# Patient Record
Sex: FEMALE | Race: BLACK OR AFRICAN AMERICAN | NOT HISPANIC OR LATINO | Employment: OTHER | URBAN - METROPOLITAN AREA
[De-identification: names, ages, dates, MRNs, and addresses within clinical notes are randomized per-mention and may not be internally consistent; named-entity substitution may affect disease eponyms.]

---

## 2017-11-27 ENCOUNTER — TELEPHONE (OUTPATIENT)
Dept: SURGERY | Facility: CLINIC | Age: 77
End: 2017-11-27

## 2017-11-27 ENCOUNTER — OFFICE VISIT (OUTPATIENT)
Dept: FAMILY MEDICINE | Facility: CLINIC | Age: 77
End: 2017-11-27
Payer: MEDICARE

## 2017-11-27 VITALS — HEIGHT: 66 IN | BODY MASS INDEX: 43.71 KG/M2 | HEART RATE: 60 BPM | WEIGHT: 272 LBS | TEMPERATURE: 98 F

## 2017-11-27 DIAGNOSIS — Z86.59 HISTORY OF BEHAVIORAL AND MENTAL HEALTH PROBLEMS: ICD-10-CM

## 2017-11-27 DIAGNOSIS — S21.002A BREAST WOUND, LEFT, INITIAL ENCOUNTER: ICD-10-CM

## 2017-11-27 DIAGNOSIS — E11.22 TYPE 2 DIABETES MELLITUS WITH CHRONIC KIDNEY DISEASE, WITH LONG-TERM CURRENT USE OF INSULIN, UNSPECIFIED CKD STAGE: ICD-10-CM

## 2017-11-27 DIAGNOSIS — I10 ESSENTIAL HYPERTENSION: Primary | ICD-10-CM

## 2017-11-27 DIAGNOSIS — R53.81 DEBILITY: ICD-10-CM

## 2017-11-27 DIAGNOSIS — Z79.4 TYPE 2 DIABETES MELLITUS WITH CHRONIC KIDNEY DISEASE, WITH LONG-TERM CURRENT USE OF INSULIN, UNSPECIFIED CKD STAGE: ICD-10-CM

## 2017-11-27 DIAGNOSIS — R41.3 MEMORY LOSS: ICD-10-CM

## 2017-11-27 DIAGNOSIS — R26.2 INABILITY TO WALK: ICD-10-CM

## 2017-11-27 DIAGNOSIS — G20.A1 PARKINSON DISEASE: ICD-10-CM

## 2017-11-27 DIAGNOSIS — E03.9 HYPOTHYROIDISM, UNSPECIFIED TYPE: ICD-10-CM

## 2017-11-27 DIAGNOSIS — C50.919 MALIGNANT NEOPLASM OF FEMALE BREAST, UNSPECIFIED ESTROGEN RECEPTOR STATUS, UNSPECIFIED LATERALITY, UNSPECIFIED SITE OF BREAST: ICD-10-CM

## 2017-11-27 DIAGNOSIS — N18.30 CKD (CHRONIC KIDNEY DISEASE) STAGE 3, GFR 30-59 ML/MIN: ICD-10-CM

## 2017-11-27 DIAGNOSIS — E78.5 HYPERLIPIDEMIA, UNSPECIFIED HYPERLIPIDEMIA TYPE: ICD-10-CM

## 2017-11-27 DIAGNOSIS — D63.8 ANEMIA OF CHRONIC DISEASE: ICD-10-CM

## 2017-11-27 PROCEDURE — 99215 OFFICE O/P EST HI 40 MIN: CPT | Mod: PBBFAC,PO | Performed by: FAMILY MEDICINE

## 2017-11-27 PROCEDURE — 99215 OFFICE O/P EST HI 40 MIN: CPT | Mod: S$PBB,,, | Performed by: FAMILY MEDICINE

## 2017-11-27 PROCEDURE — 99999 PR PBB SHADOW E&M-EST. PATIENT-LVL V: CPT | Mod: PBBFAC,,, | Performed by: FAMILY MEDICINE

## 2017-11-27 RX ORDER — HALOPERIDOL 2 MG/1
2 TABLET ORAL DAILY
COMMUNITY
Start: 2017-10-31 | End: 2017-12-04 | Stop reason: SDUPTHER

## 2017-11-27 RX ORDER — AMLODIPINE BESYLATE 5 MG/1
TABLET ORAL
COMMUNITY
Start: 2017-11-04 | End: 2017-12-04 | Stop reason: SDUPTHER

## 2017-11-27 RX ORDER — INSULIN LISPRO 100 [IU]/ML
INJECTION, SOLUTION INTRAVENOUS; SUBCUTANEOUS
COMMUNITY
Start: 2017-11-12 | End: 2017-12-04

## 2017-11-27 RX ORDER — ATORVASTATIN CALCIUM 20 MG/1
TABLET, FILM COATED ORAL
COMMUNITY
Start: 2017-09-25 | End: 2017-12-04

## 2017-11-27 RX ORDER — INSULIN GLARGINE 100 [IU]/ML
INJECTION, SOLUTION SUBCUTANEOUS
COMMUNITY
Start: 2017-09-20 | End: 2017-12-04

## 2017-11-27 RX ORDER — LEVOTHYROXINE SODIUM 50 UG/1
50 TABLET ORAL DAILY
COMMUNITY
Start: 2017-10-03 | End: 2017-12-04 | Stop reason: SDUPTHER

## 2017-11-27 RX ORDER — LISINOPRIL 20 MG/1
TABLET ORAL
COMMUNITY
Start: 2017-10-11 | End: 2017-12-04 | Stop reason: SDUPTHER

## 2017-11-27 RX ORDER — PANTOPRAZOLE SODIUM 40 MG/1
TABLET, DELAYED RELEASE ORAL
COMMUNITY
Start: 2017-09-26 | End: 2017-12-04 | Stop reason: SDUPTHER

## 2017-11-27 RX ORDER — MEMANTINE HYDROCHLORIDE 5 MG/1
5 TABLET ORAL 2 TIMES DAILY
COMMUNITY
Start: 2017-10-19 | End: 2017-12-04 | Stop reason: SDUPTHER

## 2017-11-27 RX ORDER — CHOLECALCIFEROL (VITAMIN D3) 125 MCG
CAPSULE ORAL 2 TIMES DAILY
COMMUNITY
End: 2017-12-04

## 2017-11-27 RX ORDER — ESCITALOPRAM OXALATE 10 MG/1
10 TABLET ORAL DAILY
COMMUNITY
Start: 2017-08-31 | End: 2017-12-04 | Stop reason: SDUPTHER

## 2017-11-27 NOTE — PROGRESS NOTES
"Subjective:       Patient ID: Cece Buchanan is a 77 y.o. female.    Chief Complaint: [facility]    Patient is a 77 year old female who presents today to [facility].     She reports that she has a history of breast cancer and has declined mastectomy. She has a breast wound on her left breast that has been going on for about 1 year. She had a  nurse helping with his along with family. She has never seen a wound care doctor as far as she knows. She was last being seen and treated in [place]. She was diagnosed in 2/2017    Patient reports a history of resting tremor, but is not taking anything for this. She is a poor historian and has not brought any records from [place] currently.     HTN has been present for years.     She has had hypothyroidism for "a few months."    Very unclear history from patient and from daughter who is in the room. Will request records.       Review of Systems   Constitutional: Negative for chills and fever.   Musculoskeletal: Positive for gait problem.   Skin: Positive for wound.   Psychiatric/Behavioral: Positive for confusion.         Health Maintenance Due   Topic Date Due    Foot Exam  02/15/1950    Eye Exam  02/15/1950    TETANUS VACCINE  02/15/1959    DEXA SCAN  02/15/1981    Zoster Vaccine  02/15/2001    Pneumococcal (65+) (1 of 2 - PCV13) 02/15/2006    Influenza Vaccine  08/01/2017    Hemoglobin A1c  11/18/2017     Immunization History   Administered Date(s) Administered    PPD Test 10/28/2015       Lab Review   not applicable     Objective:     Vitals:    11/27/17 0939   Pulse: 60   Temp: 97.6 °F (36.4 °C)        Physical Exam   Constitutional:   Frail appearing, in a wheelchair  Poor historian   Pulmonary/Chest:   Fungating appearing, excoriated, bleeding wound noted on left breast   Neurological:   Resting tremor noted BL       Assessment:       1. Essential hypertension    2. History of behavioral and mental health problems    3. Memory loss    4. Breast wound, left, " "initial encounter    5. Type 2 diabetes mellitus with chronic kidney disease, with long-term current use of insulin, unspecified CKD stage    6. Malignant neoplasm of female breast, unspecified estrogen receptor status, unspecified laterality, unspecified site of breast    7. Hypothyroidism, unspecified type    8. Hyperlipidemia, unspecified hyperlipidemia type    9. Debility    10. Inability to walk    11. CKD (chronic kidney disease) stage 3, GFR 30-59 ml/min    12. Anemia of chronic disease    13. Parkinson disease        Plan:       77 year old female, poor historian, complex medical history, and with no records from the past two years presents today with daughter who is POA but apparently not primary caregiver while patient was living in [place]. Patient was diagnosed with breast cancer and refused surgery while in [place]. Patient reports needing Home health today for "a breast wound." Upon examination patient was noted to have a fungating, bleeding, visible left breast tumor with open wound. Daughter in the room reports that she had never seen this before. Patient is apparently agreeable to having surgery now. Spoke to Dr. Solares's office, [facility], appointment scheduled for 9 AM tomorrow.     Patient has multiple other medical issues including diabetes, HTN, hypothyroidism, resting tremor, apparent history of dementia. Referral given to neurology HH, and social work along with breast surgery.     Briefly discussed goals of care, patient states that she wants to "live." Will follow up discussion and review medical records once they are received.     Labs to be drawn tomorrow after breast surgeon appointment.       Essential hypertension  -     Ambulatory referral to Home Health  -     Comprehensive metabolic panel; Future; Expected date: 11/27/2017    History of behavioral and mental health problems  -     Ambulatory referral to Social Work    Memory loss  -     Ambulatory referral to Social Work  -     " Ambulatory referral to Neurology    Breast wound, left, initial encounter  -     Cancel: Ambulatory referral to Wound Clinic    Type 2 diabetes mellitus with chronic kidney disease, with long-term current use of insulin, unspecified CKD stage  -     Hemoglobin A1c; Future; Expected date: 11/27/2017    Malignant neoplasm of female breast, unspecified estrogen receptor status, unspecified laterality, unspecified site of breast  -     Ambulatory consult to General Surgery    Hypothyroidism, unspecified type  -     TSH; Future; Expected date: 11/27/2017  -     T4, free; Future; Expected date: 11/27/2017    Hyperlipidemia, unspecified hyperlipidemia type  -     Lipid panel; Future; Expected date: 11/27/2017  -     Vitamin D; Future; Expected date: 11/27/2017    Debility  -     Ambulatory referral to Home Health  -     Ambulatory referral to Social Work    Inability to walk  -     Ambulatory referral to Home Health  -     Ambulatory referral to Social Work    CKD (chronic kidney disease) stage 3, GFR 30-59 ml/min  -     Vitamin D; Future; Expected date: 11/27/2017    Anemia of chronic disease    Parkinson disease  -     Ambulatory referral to Neurology      70 minutes spent with patient, of which >50% was spent on counseling and coordination of care.     Warning signs discussed, patient to call with any further issues or worsening of symptoms.

## 2017-11-27 NOTE — TELEPHONE ENCOUNTER
11/27/17               1036  Dr. Garcia called regarding patient needing to see Dr. Solares due to prior abnormal mammogram in 2015, refused surgical intervention in the past, but is now requesting surgery with urging of daughter. Patient has an existing wound on breast that is draining. Dr. Garcia states patient has been living in [place] and has had imaging done in [place]. Records being requested by his office. Appointment made for 11/28/17 at 9am. Date and time confirmed. Cell number provided in the event Dr. Solares has any questions.

## 2017-11-28 ENCOUNTER — OFFICE VISIT (OUTPATIENT)
Dept: SURGERY | Facility: CLINIC | Age: 77
End: 2017-11-28
Payer: MEDICARE

## 2017-11-28 ENCOUNTER — TELEPHONE (OUTPATIENT)
Dept: FAMILY MEDICINE | Facility: CLINIC | Age: 77
End: 2017-11-28

## 2017-11-28 ENCOUNTER — LAB VISIT (OUTPATIENT)
Dept: LAB | Facility: HOSPITAL | Age: 77
End: 2017-11-28
Attending: FAMILY MEDICINE
Payer: MEDICARE

## 2017-11-28 VITALS
TEMPERATURE: 99 F | HEART RATE: 66 BPM | BODY MASS INDEX: 43.9 KG/M2 | HEIGHT: 66 IN | DIASTOLIC BLOOD PRESSURE: 84 MMHG | SYSTOLIC BLOOD PRESSURE: 147 MMHG

## 2017-11-28 DIAGNOSIS — M79.89 SOFT TISSUE MASS: Primary | ICD-10-CM

## 2017-11-28 DIAGNOSIS — N18.30 CKD (CHRONIC KIDNEY DISEASE) STAGE 3, GFR 30-59 ML/MIN: ICD-10-CM

## 2017-11-28 DIAGNOSIS — I10 ESSENTIAL HYPERTENSION: ICD-10-CM

## 2017-11-28 DIAGNOSIS — C50.412 MALIGNANT NEOPLASM OF UPPER-OUTER QUADRANT OF LEFT FEMALE BREAST, UNSPECIFIED ESTROGEN RECEPTOR STATUS: ICD-10-CM

## 2017-11-28 DIAGNOSIS — Z79.4 TYPE 2 DIABETES MELLITUS WITH CHRONIC KIDNEY DISEASE, WITH LONG-TERM CURRENT USE OF INSULIN, UNSPECIFIED CKD STAGE: ICD-10-CM

## 2017-11-28 DIAGNOSIS — E03.9 HYPOTHYROIDISM, UNSPECIFIED TYPE: ICD-10-CM

## 2017-11-28 DIAGNOSIS — E78.5 HYPERLIPIDEMIA, UNSPECIFIED HYPERLIPIDEMIA TYPE: ICD-10-CM

## 2017-11-28 DIAGNOSIS — E11.22 TYPE 2 DIABETES MELLITUS WITH CHRONIC KIDNEY DISEASE, WITH LONG-TERM CURRENT USE OF INSULIN, UNSPECIFIED CKD STAGE: ICD-10-CM

## 2017-11-28 LAB
25(OH)D3+25(OH)D2 SERPL-MCNC: 25 NG/ML
ALBUMIN SERPL BCP-MCNC: 3.1 G/DL
ALP SERPL-CCNC: 118 U/L
ALT SERPL W/O P-5'-P-CCNC: 12 U/L
ANION GAP SERPL CALC-SCNC: 6 MMOL/L
AST SERPL-CCNC: 14 U/L
BILIRUB SERPL-MCNC: 0.5 MG/DL
BUN SERPL-MCNC: 16 MG/DL
CALCIUM SERPL-MCNC: 9.1 MG/DL
CHLORIDE SERPL-SCNC: 101 MMOL/L
CHOLEST SERPL-MCNC: 243 MG/DL
CHOLEST/HDLC SERPL: 6.9 {RATIO}
CO2 SERPL-SCNC: 31 MMOL/L
CREAT SERPL-MCNC: 1.1 MG/DL
EST. GFR  (AFRICAN AMERICAN): 56 ML/MIN/1.73 M^2
EST. GFR  (NON AFRICAN AMERICAN): 49 ML/MIN/1.73 M^2
ESTIMATED AVG GLUCOSE: 246 MG/DL
GLUCOSE SERPL-MCNC: 264 MG/DL
HBA1C MFR BLD HPLC: 10.2 %
HDLC SERPL-MCNC: 35 MG/DL
HDLC SERPL: 14.4 %
LDLC SERPL CALC-MCNC: 174.8 MG/DL
NONHDLC SERPL-MCNC: 208 MG/DL
POTASSIUM SERPL-SCNC: 4.3 MMOL/L
PROT SERPL-MCNC: 8.2 G/DL
SODIUM SERPL-SCNC: 138 MMOL/L
T4 FREE SERPL-MCNC: 1.03 NG/DL
TRIGL SERPL-MCNC: 166 MG/DL
TSH SERPL DL<=0.005 MIU/L-ACNC: 1.89 UIU/ML

## 2017-11-28 PROCEDURE — 99999 PR PBB SHADOW E&M-EST. PATIENT-LVL IV: CPT | Mod: PBBFAC,,, | Performed by: SURGERY

## 2017-11-28 PROCEDURE — 84443 ASSAY THYROID STIM HORMONE: CPT

## 2017-11-28 PROCEDURE — 88305 TISSUE EXAM BY PATHOLOGIST: CPT | Performed by: PATHOLOGY

## 2017-11-28 PROCEDURE — 80061 LIPID PANEL: CPT

## 2017-11-28 PROCEDURE — 88342 IMHCHEM/IMCYTCHM 1ST ANTB: CPT | Mod: 26,,, | Performed by: PATHOLOGY

## 2017-11-28 PROCEDURE — 99205 OFFICE O/P NEW HI 60 MIN: CPT | Mod: S$PBB,,, | Performed by: SURGERY

## 2017-11-28 PROCEDURE — 88305 TISSUE EXAM BY PATHOLOGIST: CPT | Mod: 26,,, | Performed by: PATHOLOGY

## 2017-11-28 PROCEDURE — 99214 OFFICE O/P EST MOD 30 MIN: CPT | Mod: PBBFAC,PO | Performed by: SURGERY

## 2017-11-28 PROCEDURE — 84439 ASSAY OF FREE THYROXINE: CPT

## 2017-11-28 PROCEDURE — 82306 VITAMIN D 25 HYDROXY: CPT

## 2017-11-28 PROCEDURE — 36415 COLL VENOUS BLD VENIPUNCTURE: CPT

## 2017-11-28 PROCEDURE — 80053 COMPREHEN METABOLIC PANEL: CPT

## 2017-11-28 PROCEDURE — 83036 HEMOGLOBIN GLYCOSYLATED A1C: CPT

## 2017-11-28 NOTE — TELEPHONE ENCOUNTER
----- Message from Angelita Weaver sent at 11/27/2017 10:01 AM CST -----  Contact: [phone]/Nakita with [facility] its requesting to speak with the nurse in regarding pt's home health orders. Please advise

## 2017-11-28 NOTE — LETTER
November 28, 2017      Andrew Garcia, DO  [address]  [place] [address]           [facility]  [address]  [address]  [place] [address]  Phone: [phone]          Patient: Cece Buchanan   MR Number: [id number]   YOB: 1940   Date of Visit: 11/28/2017       Dear Dr. Andrew Garcia:    Thank you for referring Cece Buchanan to me for evaluation. Attached you will find relevant portions of my assessment and plan of care.    If you have questions, please do not hesitate to call me. I look forward to following Cece Buchanan along with you.    Sincerely,    Aleisha Solares MD    Enclosure  CC:  No Recipients    If you would like to receive this communication electronically, please contact [email or web address] or [phone] to request more information on [employer] Link access.    For providers and/or their staff who would like to refer a patient to [facility], please contact us through our one-stop-shop provider referral line, [facility] , at [phone].    If you feel you have received this communication in error or would no longer like to receive these types of communications, please e-mail [email or web address]

## 2017-11-28 NOTE — TELEPHONE ENCOUNTER
----- Message from Christine Lugo sent at 11/28/2017  8:34 AM CST -----  Contact: david santiago [facility] [phone]  Please call to clarify orders

## 2017-11-28 NOTE — PROGRESS NOTES
History & Physical    Subjective:      Patient referred by primary care physician for evaluation of left breast mass, fungating.  A mammogram obtained in  was abnormal, demonstrating indeterminate calcifications with grouped distribution in the posterior upper outer region of the right breast.  There is oval mass at the skin margins in the anterior upper outer region of the left breast.  A biopsy was obtained however she moved to [place] where she resumed care.  Since  this mass in her left breast has been growing in size.  It is being maintained by home health nurse Natasha prior to this refused any surgery.  She was told that chemotherapy was not an option given her comorbidities.  According to her daughter a PET scan was obtained in 2017, which did not show metastatic disease.     Menarche occurred at age 13.  Surgical menopause occurred at age 29.  She had a MAY/BSO.  No history of hormone replacement.  She is  with age first pregnancy 20.  No tobacco use history.  No family history of cancer.  No personal history of breast cancer or breast biopsy.         [facility] [phone] imaging  [facility] surgical       Chief Complaint   Patient presents with    Cancer       Review of patient's allergies indicates:  No Known Allergies         Current Outpatient Prescriptions   Medication Sig Dispense Refill     Current Outpatient Prescriptions on File Prior to Visit   Medication Sig Dispense Refill    amLODIPine (NORVASC) 5 MG tablet       atorvastatin (LIPITOR) 20 MG tablet       carvedilol (COREG) 12.5 MG tablet Take 1 tablet (12.5 mg total) by mouth 2 (two) times daily with meals. 180 tablet 3    escitalopram oxalate (LEXAPRO) 10 MG tablet Take 10 tablets by mouth once daily.      haloperidol (HALDOL) 2 MG tablet Take 2 tablets by mouth once daily.      HUMALOG KWIKPEN 100 unit/mL InPn pen       LANTUS SOLOSTAR 100 unit/mL (3 mL) InPn pen       levothyroxine (SYNTHROID) 50  MCG tablet Take 50 tablets by mouth once daily.      lisinopril (PRINIVIL,ZESTRIL) 20 MG tablet       memantine (NAMENDA) 5 MG Tab Take 5 tablets by mouth 2 (two) times daily.      multivitamin capsule Take 1 capsule by mouth once daily.      naproxen sodium (ALEVE) 220 mg Cap Take by mouth 2 (two) times daily.      pantoprazole (PROTONIX) 40 MG tablet        No current facility-administered medications on file prior to visit.          Past Medical History     Past Medical History:   Diagnosis Date    Breast cancer     Diabetes mellitus     Diabetes mellitus type I     GERD (gastroesophageal reflux disease)     Hyperlipidemia     Hypertension     Hypothyroidism     Parkinson's disease        Past Surgical History     Past Surgical History:   Procedure Laterality Date    FINGER SURGERY      HYSTERECTOMY         Family History   History reviewed. No pertinent family history.      Review of Systems  Review of Systems   Constitutional: Negative for chills and fever.   HENT: Negative for congestion.    Eyes: Negative for blurred vision and double vision.   Respiratory: Negative for cough and shortness of breath.    Cardiovascular: Negative for chest pain and palpitations.   Gastrointestinal: Negative for abdominal pain, nausea and vomiting.   Genitourinary: Negative for dysuria and frequency.   Musculoskeletal: Negative for back pain and neck pain.   Skin: Negative for itching and rash.   Neurological: Negative for dizziness, seizures and headaches.   Endo/Heme/Allergies: Does not bruise/bleed easily.   Psychiatric/Behavioral: Negative for depression and substance abuse.         OBJECTIVE:     Vital Signs (Most Recent)  Vitals:    11/28/17 0939   BP: (!) 147/84   Pulse: 66   Temp: 98.6 °F (37 °C)       Physical Exam   Constitutional: She is oriented to person, place, and time and well-developed, well-nourished, and in no distress. No distress.   HENT:   Head: Normocephalic and atraumatic.   Eyes:  Conjunctivae are normal. No scleral icterus.   Neck: Normal range of motion.   Cardiovascular: Normal rate and intact distal pulses.    Pulmonary/Chest: Effort normal. No stridor. No respiratory distress. Left breast exhibits mass, skin change and tenderness. Left breast exhibits no inverted nipple and no nipple discharge. Breasts are asymmetrical.   Abdominal: Soft. She exhibits no distension. There is no tenderness.   Musculoskeletal: Normal range of motion. She exhibits no edema or tenderness.   Neurological: She is alert and oriented to person, place, and time.   Skin: Skin is warm. No rash noted. She is not diaphoretic. No erythema.   Psychiatric: Affect and judgment normal.             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  Unknown receptor status      Plan:     Options for management were discussed with the patient and her family.     We also discussed the role of neoadjuvant systemic or endocrine therapy in the treatment of a locally advanced breast cancer.  We discussed that this is based on tumor biology and tiffanie status and will be determined based on final pathology.  We discussed that if the cancer is hormone positive, endocrine therapy would be recommended in most cases and its use can reduce the risk of recurrence as well as improve survival. Side effects of treatment were briefly discussed. We also discussed the potential role for chemotherapy based on a number of factors such as tumor phenotype (ER+ vs. triple negative vs. Tcf2bnv+) and this would be determined in coordination with the medical oncologist. A punch biopsy was obtained today.       she will return in 1 week after records obtained and final path available.

## 2017-11-28 NOTE — TELEPHONE ENCOUNTER
Called Elida at [facility] and Dr. Garcia took over call on the phone and adv hold off on wound car orders pt has breast cancer that is seeping out of her breast the size of his fists and that daughter and pt are seeing a surgeon today at 9 am and surgeon will make that decision for wound care. Elida verbalized understanding.

## 2017-11-30 ENCOUNTER — TELEPHONE (OUTPATIENT)
Dept: ADMINISTRATIVE | Facility: CLINIC | Age: 77
End: 2017-11-30

## 2017-11-30 ENCOUNTER — TELEPHONE (OUTPATIENT)
Dept: SURGERY | Facility: CLINIC | Age: 77
End: 2017-11-30

## 2017-11-30 NOTE — TELEPHONE ENCOUNTER
Dr. Garcia spoke with HH and informed not able to fully assess patient due to large breast mass, draining serosanguinous drainage.

## 2017-11-30 NOTE — TELEPHONE ENCOUNTER
As per Dr Garcia, medication review will be done on next appointment with him on 12/04/17 .  Patient was treated for Malignant neoplasm on left breast

## 2017-11-30 NOTE — TELEPHONE ENCOUNTER
----- Message from Moody Mayberry LPN sent at 11/29/2017  4:41 PM CST -----  Contact: Brittany bolanos/ [facility] [phone]      ----- Message -----  From: Rylee Zamora  Sent: 11/29/2017   4:34 PM  To: Beck Moraes Staff    Brittany calling to get wound care orders for patient. Please call patient and advise.

## 2017-11-30 NOTE — TELEPHONE ENCOUNTER
Wound care  Wash left breast with saline, cover with xeroform and then gaauze with medipore tape once daily.

## 2017-12-01 ENCOUNTER — TELEPHONE (OUTPATIENT)
Dept: FAMILY MEDICINE | Facility: CLINIC | Age: 77
End: 2017-12-01

## 2017-12-01 NOTE — TELEPHONE ENCOUNTER
----- Message from Melinda Owen sent at 12/1/2017 12:38 PM CST -----  Contact: Deb  x_  1st Request  _  2nd Request  _  3rd Request        Who:  Deb /[facility]    Why: Requesting a call back in regards to Please fax over orders to [facility] for a Bariatric rollling walker and po  Fax # [phone]    What Number to Call Back: [phone]    When to Expect a call back: (Within 24 hours)    Please return the call at earliest convenience. Thanks!

## 2017-12-01 NOTE — TELEPHONE ENCOUNTER
----- Message from Andrew Garcia DO sent at 11/30/2017  5:01 PM CST -----  Please call the patient and let her or family know that we will discuss her lab results in person at her next visit. She has very uncontrolled diabetes among other issues and I would like to discuss them all in person.

## 2017-12-01 NOTE — TELEPHONE ENCOUNTER
Spoke with patient about lab result will be discuss on office visit on 12/04/17.  Mrs Dubois verbalized understanding on instruction given.

## 2017-12-04 ENCOUNTER — OFFICE VISIT (OUTPATIENT)
Dept: FAMILY MEDICINE | Facility: CLINIC | Age: 77
End: 2017-12-04
Payer: MEDICARE

## 2017-12-04 VITALS
WEIGHT: 269.19 LBS | SYSTOLIC BLOOD PRESSURE: 160 MMHG | TEMPERATURE: 98 F | HEART RATE: 63 BPM | BODY MASS INDEX: 43.45 KG/M2 | OXYGEN SATURATION: 98 % | DIASTOLIC BLOOD PRESSURE: 90 MMHG

## 2017-12-04 DIAGNOSIS — I10 ESSENTIAL HYPERTENSION: ICD-10-CM

## 2017-12-04 DIAGNOSIS — G30.1 LATE ONSET ALZHEIMER'S DISEASE WITH BEHAVIORAL DISTURBANCE: ICD-10-CM

## 2017-12-04 DIAGNOSIS — E11.22 TYPE 2 DIABETES MELLITUS WITH STAGE 3 CHRONIC KIDNEY DISEASE, WITH LONG-TERM CURRENT USE OF INSULIN: Primary | ICD-10-CM

## 2017-12-04 DIAGNOSIS — F02.818 LATE ONSET ALZHEIMER'S DISEASE WITH BEHAVIORAL DISTURBANCE: ICD-10-CM

## 2017-12-04 DIAGNOSIS — R79.89 LOW VITAMIN D LEVEL: ICD-10-CM

## 2017-12-04 DIAGNOSIS — Z79.4 TYPE 2 DIABETES MELLITUS WITH STAGE 3 CHRONIC KIDNEY DISEASE, WITH LONG-TERM CURRENT USE OF INSULIN: Primary | ICD-10-CM

## 2017-12-04 DIAGNOSIS — N18.30 CKD (CHRONIC KIDNEY DISEASE) STAGE 3, GFR 30-59 ML/MIN: ICD-10-CM

## 2017-12-04 DIAGNOSIS — N18.30 TYPE 2 DIABETES MELLITUS WITH STAGE 3 CHRONIC KIDNEY DISEASE, WITH LONG-TERM CURRENT USE OF INSULIN: Primary | ICD-10-CM

## 2017-12-04 DIAGNOSIS — R26.89 NEED FOR ASSISTANCE DUE TO UNSTEADY GAIT: Primary | ICD-10-CM

## 2017-12-04 DIAGNOSIS — K21.9 GASTROESOPHAGEAL REFLUX DISEASE, ESOPHAGITIS PRESENCE NOT SPECIFIED: ICD-10-CM

## 2017-12-04 DIAGNOSIS — E03.9 ACQUIRED HYPOTHYROIDISM: ICD-10-CM

## 2017-12-04 DIAGNOSIS — E78.5 HYPERLIPIDEMIA, UNSPECIFIED HYPERLIPIDEMIA TYPE: ICD-10-CM

## 2017-12-04 PROCEDURE — 99999 PR PBB SHADOW E&M-EST. PATIENT-LVL IV: CPT | Mod: PBBFAC,,, | Performed by: FAMILY MEDICINE

## 2017-12-04 PROCEDURE — 99214 OFFICE O/P EST MOD 30 MIN: CPT | Mod: PBBFAC,PO | Performed by: FAMILY MEDICINE

## 2017-12-04 PROCEDURE — 99215 OFFICE O/P EST HI 40 MIN: CPT | Mod: S$PBB,,, | Performed by: FAMILY MEDICINE

## 2017-12-04 RX ORDER — INSULIN GLARGINE 100 [IU]/ML
25 INJECTION, SOLUTION SUBCUTANEOUS NIGHTLY
Qty: 30 ML | Refills: 1 | Status: CANCELLED | OUTPATIENT
Start: 2017-12-04

## 2017-12-04 RX ORDER — ATORVASTATIN CALCIUM 40 MG/1
40 TABLET, FILM COATED ORAL DAILY
Qty: 90 TABLET | Refills: 3 | Status: SHIPPED | OUTPATIENT
Start: 2017-12-04 | End: 2018-01-18

## 2017-12-04 RX ORDER — HALOPERIDOL 2 MG/1
2 TABLET ORAL DAILY
Qty: 90 TABLET | Refills: 3 | Status: SHIPPED | OUTPATIENT
Start: 2017-12-04 | End: 2018-01-19 | Stop reason: SDUPTHER

## 2017-12-04 RX ORDER — INSULIN DEGLUDEC 100 U/ML
30 INJECTION, SOLUTION SUBCUTANEOUS DAILY
Qty: 27 ML | Refills: 1 | Status: SHIPPED | OUTPATIENT
Start: 2017-12-04 | End: 2018-01-19

## 2017-12-04 RX ORDER — AMLODIPINE BESYLATE 5 MG/1
5 TABLET ORAL DAILY
Qty: 90 TABLET | Refills: 3 | Status: SHIPPED | OUTPATIENT
Start: 2017-12-04 | End: 2018-02-01 | Stop reason: SDUPTHER

## 2017-12-04 RX ORDER — LEVOTHYROXINE SODIUM 50 UG/1
50 TABLET ORAL DAILY
Qty: 90 TABLET | Refills: 2 | Status: SHIPPED | OUTPATIENT
Start: 2017-12-04 | End: 2018-04-17 | Stop reason: SDUPTHER

## 2017-12-04 RX ORDER — CARVEDILOL 12.5 MG/1
12.5 TABLET ORAL 2 TIMES DAILY WITH MEALS
Qty: 180 TABLET | Refills: 3 | Status: SHIPPED | OUTPATIENT
Start: 2017-12-04 | End: 2019-10-30

## 2017-12-04 RX ORDER — METFORMIN HYDROCHLORIDE 1000 MG/1
1000 TABLET, FILM COATED, EXTENDED RELEASE ORAL
Qty: 90 TABLET | Refills: 3 | Status: SHIPPED | OUTPATIENT
Start: 2017-12-04 | End: 2017-12-19

## 2017-12-04 RX ORDER — LISINOPRIL 20 MG/1
20 TABLET ORAL DAILY
Qty: 90 TABLET | Refills: 2 | Status: SHIPPED | OUTPATIENT
Start: 2017-12-04 | End: 2018-04-17 | Stop reason: SDUPTHER

## 2017-12-04 RX ORDER — ESCITALOPRAM OXALATE 10 MG/1
10 TABLET ORAL DAILY
Qty: 90 TABLET | Refills: 2 | Status: SHIPPED | OUTPATIENT
Start: 2017-12-04 | End: 2018-04-17 | Stop reason: SDUPTHER

## 2017-12-04 RX ORDER — ERGOCALCIFEROL 1.25 MG/1
50000 CAPSULE ORAL
Qty: 4 CAPSULE | Refills: 3 | Status: SHIPPED | OUTPATIENT
Start: 2017-12-04 | End: 2018-03-21

## 2017-12-04 RX ORDER — MEMANTINE HYDROCHLORIDE 5 MG/1
5 TABLET ORAL 2 TIMES DAILY
Qty: 90 TABLET | Refills: 2 | Status: SHIPPED | OUTPATIENT
Start: 2017-12-04 | End: 2018-01-24 | Stop reason: SDUPTHER

## 2017-12-04 RX ORDER — PANTOPRAZOLE SODIUM 40 MG/1
40 TABLET, DELAYED RELEASE ORAL DAILY
Qty: 90 TABLET | Refills: 2 | Status: SHIPPED | OUTPATIENT
Start: 2017-12-04 | End: 2018-04-17 | Stop reason: SDUPTHER

## 2017-12-04 NOTE — TELEPHONE ENCOUNTER
I have not seen her in quite a while she may have a new md or possible followed by a specialist who can address this for her

## 2017-12-04 NOTE — PATIENT INSTRUCTIONS
Tresiba 30 units daily - instructed to titrate up by 2 units every 3-4 days if blood glucose consistently above 130 in the morning.  Goal blood sugar between 130-200 fasting  Stopped short acting insulin  Restart metformin 1000 mg once daily  Endocrine referral    Will also need to consider long term goals in face of breast cancer, we are waiting for biopsy results. Acute stress can cause changes to blood sugar.    Reviewed patient's current insulin regimen. Counseled to properly rotate injection sites.     Advised frequent self blood glucose monitoring.  Patient encouraged to document glucose results and bring them to every clinic visit. Will send me a log on patient portal in 2 weeks.

## 2017-12-04 NOTE — PROGRESS NOTES
Subjective:       Patient ID: Cece Buchanan is a 77 y.o. female.    Chief Complaint: Follow-up (Breast )    Cece is a 77 y.o. female who presents today for follow up for her diabetes, lab work, and breast mass. At the last visit, patient was seen to have a fungating breast mass and was sent to breast surgery. Final biopsy results and records are pending. Family has returned today to review blood work and to try and establish goals of care. I still do not have records from physician in [place].     In addition, patient reports that she was not taking her amlodipine as prescribed. Her pressures have been uncontrolled, but we will restart this medication today and recheck in one month. Per daughter, they were not taking the medication because family here was unaware that she needed it and they did not have any. This was refilled today.     She is also complaining of labile blood sugars and worsening diabetes. Her A1c has gone from <7 two years ago to 10.2 today. She is on a sliding scale humalog but is unaware of the scale. This was stopped today. Given her age and co-morbidities, her goal A1c is likely 8, but I will still send patient to endocrinology for assistance.     Hypertension   This is a chronic problem. The current episode started more than 1 year ago. The problem is uncontrolled. Pertinent negatives include no anxiety, blurred vision, peripheral edema or shortness of breath. There are no associated agents to hypertension. Risk factors for coronary artery disease include sedentary lifestyle, obesity, dyslipidemia and diabetes mellitus. Past treatments include beta blockers, calcium channel blockers and ACE inhibitors. The current treatment provides mild improvement. Compliance problems: not taking medication as prescribed, social issues.  Hypertensive end-organ damage includes kidney disease.   Diabetes   She presents for her follow-up diabetic visit. She has type 2 diabetes mellitus. Her disease course has  been worsening. Hypoglycemia symptoms include confusion and hunger. Associated symptoms include weakness. Pertinent negatives for diabetes include no blurred vision and no weight loss. Pertinent negatives for hypoglycemia complications include no blackouts and no hospitalization. Risk factors for coronary artery disease include hypertension, sedentary lifestyle, diabetes mellitus and dyslipidemia. Current diabetic treatment includes insulin injections and oral agent (monotherapy). She is compliant with treatment some of the time. She has not had a previous visit with a dietitian. She never participates in exercise. An ACE inhibitor/angiotensin II receptor blocker is being taken. She does not see a podiatrist.Eye exam is not current.     Review of Systems   Constitutional: Negative for weight loss.   Eyes: Negative for blurred vision.   Respiratory: Negative for shortness of breath.    Neurological: Positive for weakness.   Psychiatric/Behavioral: Positive for confusion.         Results for orders placed or performed in visit on 11/28/17   Comprehensive metabolic panel   Result Value Ref Range    Sodium 138 136 - 145 mmol/L    Potassium 4.3 3.5 - 5.1 mmol/L    Chloride 101 95 - 110 mmol/L    CO2 31 (H) 23 - 29 mmol/L    Glucose 264 (H) 70 - 110 mg/dL    BUN, Bld 16 8 - 23 mg/dL    Creatinine 1.1 0.5 - 1.4 mg/dL    Calcium 9.1 8.7 - 10.5 mg/dL    Total Protein 8.2 6.0 - 8.4 g/dL    Albumin 3.1 (L) 3.5 - 5.2 g/dL    Total Bilirubin 0.5 0.1 - 1.0 mg/dL    Alkaline Phosphatase 118 55 - 135 U/L    AST 14 10 - 40 U/L    ALT 12 10 - 44 U/L    Anion Gap 6 (L) 8 - 16 mmol/L    eGFR if African American 56 (A) >60 mL/min/1.73 m^2    eGFR if non African American 49 (A) >60 mL/min/1.73 m^2   Hemoglobin A1c   Result Value Ref Range    Hemoglobin A1C 10.2 (H) 4.0 - 5.6 %    Estimated Avg Glucose 246 (H) 68 - 131 mg/dL   Lipid panel   Result Value Ref Range    Cholesterol 243 (H) 120 - 199 mg/dL    Triglycerides 166 (H) 30 - 150  mg/dL    HDL 35 (L) 40 - 75 mg/dL    LDL Cholesterol 174.8 (H) 63.0 - 159.0 mg/dL    HDL/Chol Ratio 14.4 (L) 20.0 - 50.0 %    Total Cholesterol/HDL Ratio 6.9 (H) 2.0 - 5.0    Non-HDL Cholesterol 208 mg/dL   TSH   Result Value Ref Range    TSH 1.891 0.400 - 4.000 uIU/mL   T4, free   Result Value Ref Range    Free T4 1.03 0.71 - 1.51 ng/dL   Vitamin D   Result Value Ref Range    Vit D, 25-Hydroxy 25 (L) 30 - 96 ng/mL       Objective:     Vitals:    12/04/17 0937   BP: (!) 160/90   Pulse: 63   Temp: 97.9 °F (36.6 °C)        Physical Exam   Constitutional:   In a wheelchair   Cardiovascular: Normal rate and regular rhythm.    Pulmonary/Chest: Effort normal and breath sounds normal.   Skin: Rash noted. There is erythema.   Fungating breast lesion (left)   Psychiatric: She has a normal mood and affect. Her behavior is normal.   Nursing note and vitals reviewed.      Assessment:       1. Type 2 diabetes mellitus with stage 3 chronic kidney disease, with long-term current use of insulin    2. Hyperlipidemia, unspecified hyperlipidemia type    3. Essential hypertension    4. Late onset Alzheimer's disease with behavioral disturbance    5. CKD (chronic kidney disease) stage 3, GFR 30-59 ml/min    6. Acquired hypothyroidism    7. Low vitamin D level    8. Gastroesophageal reflux disease, esophagitis presence not specified        Plan:       77 year old female with complex medical history and visible breast cancer, pending final biopsy results and medical records from [place] is presenting today for follow up on lab work and other issues.     DM: I have switched her from lantus and humalog to tresiba and metformin. Still unclear what her goals of care are but goal A1c<8 is reasonable. Tresiba 30 units daily - instructed to titrate up by 2 units every 3-4 days if blood glucose consistently above 130 in the morning. Goal blood sugar between 130-200 fasting. Stopped short acting insulin  Restart metformin 1000 mg once daily.  Endocrine referral given to assist in managing diabetes in this patient with major co-morbidities  Will also need to consider long term goals in face of breast cancer, we are waiting for biopsy results. Acute stress can cause changes to blood sugar. Reviewed patient's current insulin regimen. Counseled to properly rotate injection sites. Advised frequent self blood glucose monitoring.  Patient encouraged to document glucose results and bring them to every clinic visit. Will send me a log on patient portal in 2 weeks    Lipids: increased to high intensity statin  Vitamin D: started supplementation   CKD: stop NSAIDS, decrease red meat, follow up PRN  HTN: refilled all medications and given instructions on when to take them  Hypothyroidism: TSH/T4 WNL, refilled medication  GERD: continue PPI    Type 2 diabetes mellitus with stage 3 chronic kidney disease, with long-term current use of insulin  -     Ambulatory referral to Endocrinology  -     insulin degludec 100 unit/mL (3 mL) InPn; Inject 30 Units into the skin once daily.  Dispense: 27 mL; Refill: 1  -     metFORMIN (GLUMETZA) 1000 MG (MOD) 24 hr tablet; Take 1 tablet (1,000 mg total) by mouth daily with breakfast.  Dispense: 90 tablet; Refill: 3    Hyperlipidemia, unspecified hyperlipidemia type  -     atorvastatin (LIPITOR) 40 MG tablet; Take 1 tablet (40 mg total) by mouth once daily.  Dispense: 90 tablet; Refill: 3    Essential hypertension  -     amLODIPine (NORVASC) 5 MG tablet; Take 1 tablet (5 mg total) by mouth once daily.  Dispense: 90 tablet; Refill: 3  -     carvedilol (COREG) 12.5 MG tablet; Take 1 tablet (12.5 mg total) by mouth 2 (two) times daily with meals.  Dispense: 180 tablet; Refill: 3  -     lisinopril (PRINIVIL,ZESTRIL) 20 MG tablet; Take 1 tablet (20 mg total) by mouth once daily.  Dispense: 90 tablet; Refill: 2    Late onset Alzheimer's disease with behavioral disturbance  -     escitalopram oxalate (LEXAPRO) 10 MG tablet; Take 1 tablet (10  mg total) by mouth once daily.  Dispense: 90 tablet; Refill: 2  -     haloperidol (HALDOL) 2 MG tablet; Take 1 tablet (2 mg total) by mouth once daily.  Dispense: 90 tablet; Refill: 3  -     memantine (NAMENDA) 5 MG Tab; Take 1 tablet (5 mg total) by mouth 2 (two) times daily.  Dispense: 90 tablet; Refill: 2    CKD (chronic kidney disease) stage 3, GFR 30-59 ml/min    Acquired hypothyroidism  -     levothyroxine (SYNTHROID) 50 MCG tablet; Take 1 tablet (50 mcg total) by mouth once daily.  Dispense: 90 tablet; Refill: 2    Low vitamin D level  -     ergocalciferol (ERGOCALCIFEROL) 50,000 unit Cap; Take 1 capsule (50,000 Units total) by mouth every 7 days.  Dispense: 4 capsule; Refill: 3    Gastroesophageal reflux disease, esophagitis presence not specified  -     pantoprazole (PROTONIX) 40 MG tablet; Take 1 tablet (40 mg total) by mouth once daily.  Dispense: 90 tablet; Refill: 2    Other orders  -     Cancel: LANTUS SOLOSTAR 100 unit/mL (3 mL) InPn pen; Inject 25 Units into the skin every evening.  Dispense: 30 mL; Refill: 1      Warning signs discussed, patient to call with any further issues or worsening of symptoms.   45 minutes spent with patient, of which >50% was spent on counseling and coordination of care.

## 2017-12-05 ENCOUNTER — TELEPHONE (OUTPATIENT)
Dept: SURGERY | Facility: CLINIC | Age: 77
End: 2017-12-05

## 2017-12-05 NOTE — TELEPHONE ENCOUNTER
12/5/2017         1640  Attempted to contact pt to inform her that Dr. Solares will be in [place] tomorrow morning, and she can either reschedule to that location or keep the appt in [place]. No answer. Left voicemail.

## 2017-12-06 ENCOUNTER — TELEPHONE (OUTPATIENT)
Dept: SURGERY | Facility: CLINIC | Age: 77
End: 2017-12-06

## 2017-12-06 DIAGNOSIS — C50.112 MALIGNANT NEOPLASM OF CENTRAL PORTION OF LEFT BREAST IN FEMALE, ESTROGEN RECEPTOR NEGATIVE: Primary | ICD-10-CM

## 2017-12-06 DIAGNOSIS — Z17.1 MALIGNANT NEOPLASM OF CENTRAL PORTION OF LEFT BREAST IN FEMALE, ESTROGEN RECEPTOR NEGATIVE: Primary | ICD-10-CM

## 2017-12-11 ENCOUNTER — TELEPHONE (OUTPATIENT)
Dept: FAMILY MEDICINE | Facility: CLINIC | Age: 77
End: 2017-12-11

## 2017-12-11 NOTE — TELEPHONE ENCOUNTER
----- Message from Kymberly Adam sent at 12/11/2017  9:55 AM CST -----  Contact: Brittany from [facility] [phone]  Brittany from [facility] would like current medication list fax to [facility]. Please advise.

## 2017-12-11 NOTE — TELEPHONE ENCOUNTER
Spoke with Brittany from [facility] and informed her that current medication list was faxed over to [facility].

## 2017-12-12 ENCOUNTER — TELEPHONE (OUTPATIENT)
Dept: SURGERY | Facility: CLINIC | Age: 77
End: 2017-12-12

## 2017-12-12 NOTE — TELEPHONE ENCOUNTER
12/12/2017            1340  Spoke w/ pt's daughter to schedule a f/u appt per Dr. Solares after pt's PET CT. Scheduled pr for 12/29/2017 @ 1040, per daughter's request. Confirmed appt date and time. Pt's daughter verbalized understanding.

## 2017-12-14 ENCOUNTER — TELEPHONE (OUTPATIENT)
Dept: ADMINISTRATIVE | Facility: CLINIC | Age: 77
End: 2017-12-14

## 2017-12-15 NOTE — TELEPHONE ENCOUNTER
Please advise her to increase her fiber and water intake, having scheduled toileting time, adding milk of magnesia, and colace. They may also need to consider an enema at this point since she has not had a BM in a week. The medicines I listed above are OTC. If these continue into next week, please have them make an appointment with me. When you are calling to give them instructions, please ask if Ms. Buchanan is having any abdominal pain and any stools at all (watery etc). Thanks.

## 2017-12-18 ENCOUNTER — TELEPHONE (OUTPATIENT)
Dept: ADMINISTRATIVE | Facility: CLINIC | Age: 77
End: 2017-12-18

## 2017-12-18 DIAGNOSIS — Z87.898 HISTORY OF UNSTEADY GAIT: Primary | ICD-10-CM

## 2017-12-18 NOTE — TELEPHONE ENCOUNTER
Spoke with daughter Sherly and informed can have mother increase fiber and water intake. Can use MOM and colace. If no bm can try enema. All can be purchased OTC. Instructed daughter to call office if no results. Daughter voices understanding.

## 2017-12-19 ENCOUNTER — OFFICE VISIT (OUTPATIENT)
Dept: NEUROLOGY | Facility: CLINIC | Age: 77
End: 2017-12-19
Payer: MEDICARE

## 2017-12-19 ENCOUNTER — TELEPHONE (OUTPATIENT)
Dept: FAMILY MEDICINE | Facility: CLINIC | Age: 77
End: 2017-12-19

## 2017-12-19 DIAGNOSIS — R41.3 MEMORY LOSS: Primary | ICD-10-CM

## 2017-12-19 PROCEDURE — 99999 PR PBB SHADOW E&M-EST. PATIENT-LVL III: CPT | Mod: PBBFAC,,, | Performed by: PSYCHIATRY & NEUROLOGY

## 2017-12-19 PROCEDURE — 99204 OFFICE O/P NEW MOD 45 MIN: CPT | Mod: S$PBB,,, | Performed by: PSYCHIATRY & NEUROLOGY

## 2017-12-19 PROCEDURE — 99213 OFFICE O/P EST LOW 20 MIN: CPT | Mod: PBBFAC,PO | Performed by: PSYCHIATRY & NEUROLOGY

## 2017-12-19 NOTE — LETTER
December 19, 2017      Andrew Garcia, DO  [address]  [place] [address]           [place] [facility]  [address]  [place] [address]  Phone: [phone]  Fax: [phone]          Patient: Cece Buchanan   MR Number: [id number]   YOB: 1940   Date of Visit: 12/19/2017       Dear Dr. Andrew Garcia:    Thank you for referring Cece Buchanan to me for evaluation. Attached you will find relevant portions of my assessment and plan of care.    If you have questions, please do not hesitate to call me. I look forward to following Cece Buchanan along with you.    Sincerely,    Colt Landon MD    Enclosure  CC:  No Recipients    If you would like to receive this communication electronically, please contact [email or web address] or [phone] to request more information on [employer] access.    For providers and/or their staff who would like to refer a patient to [facility], please contact us through our one-stop-shop provider referral line, [facility] [place], at [phone].    If you feel you have received this communication in error or would no longer like to receive these types of communications, please e-mail [email or web address]

## 2017-12-19 NOTE — TELEPHONE ENCOUNTER
"----- Message from Valorie Cedeno sent at 12/19/2017  1:56 PM CST -----  Contact: Suellen / [facility] / # [phone]  _  1st Request  X  2nd Request  _  3rd Request    Who: Cece Buchanan (mrn# [id number])    Why: Suellen with [facility] called and said, "patient's last day of  occupational therapy was yesterday Monday 12/18/2017."  Please give a call back at your earliest convenience if there's a need to.  THANKS!    What Number to Call Back:  [phone]    When to Expect a call back: (Before the end of the day)   -- if the call is after 12:00, the call back will be tomorrow.                          "

## 2017-12-20 ENCOUNTER — TELEPHONE (OUTPATIENT)
Dept: FAMILY MEDICINE | Facility: CLINIC | Age: 77
End: 2017-12-20

## 2017-12-20 ENCOUNTER — TELEPHONE (OUTPATIENT)
Dept: SURGERY | Facility: CLINIC | Age: 77
End: 2017-12-20

## 2017-12-20 NOTE — PROGRESS NOTES
"  Cece Buchanan is a 77 y.o. year old female that  presents for evaluation of memory loss. She is accompany by her daughter  Chief Complaint   Patient presents with    Memory Loss   .     HPI:  I had the pleasure of seeing Mrs Cece Buchanan in consultation today. They said that they don't know why they are in this office as they thought they were coming to see the cardiologist.  In fact, patient herself said that there is nothing wrong with her brain or memory.  Mrs Buchanan has a medical  History as detailed below.  Patient daughter sad that Mrs Buchanan came back from [place] 3 weeks ago and that she was diagnosed with a psychiatric condition in 2015 when she stopped walking, was making no sense in terms of the content of her conversations,  and forgot to take care of herself. However, she said that the main issue at that time was psychiatric in nature as the memory came back gradually.  In any event, patient has been on Namenda and Haldol although they said they are not sure why.  Mrs Buchanan said that there is nothing wrong with her memory and attributes everything to the fact that back in 2015 " I was initiated on the ninoska of God".  No reported changes in personality, mood, or behavior.  At this time patient requires assistance with her personal care.  Denies HA, vertigo, double vision, slurred speech, language or vision impairment. She is wheelchair bound.    Past Medical History:   Diagnosis Date    Breast cancer     Diabetes mellitus     Diabetes mellitus type I     GERD (gastroesophageal reflux disease)     Hyperlipidemia     Hypertension     Hypothyroidism     Parkinson's disease      Social History     Social History    Marital status:      Spouse name: N/A    Number of children: N/A    Years of education: N/A     Occupational History    Not on file.     Social History Main Topics    Smoking status: Never Smoker    Smokeless tobacco: Not on file    Alcohol use No    Drug use: No    " Sexual activity: Not on file     Other Topics Concern    Not on file     Social History Narrative    No narrative on file     Past Surgical History:   Procedure Laterality Date    FINGER SURGERY      HYSTERECTOMY       History reviewed. No pertinent family history.        Review of Systems  General ROS: negative for chills, fever or weight loss  Psychological ROS: negative for hallucination, depression or suicidal ideation  Ophthalmic ROS: negative for blurry vision, photophobia or eye pain  ENT ROS: negative for epistaxis, sore throat or rhinorrhea  Respiratory ROS: no cough, shortness of breath, or wheezing  Cardiovascular ROS: no chest pain or dyspnea on exertion  Gastrointestinal ROS: no abdominal pain, change in bowel habits, or black/ bloody stools  Genito-Urinary ROS: no dysuria, trouble voiding, or hematuria  Musculoskeletal ROS: negative for gait disturbance or muscular weakness  Neurological ROS: no syncope or seizures; no ataxia  Dermatological ROS: negative for pruritis, rash and jaundice      Physical Exam:  There were no vitals taken for this visit.  General appearance: alert, cooperative, no distress  Constitutional:Oriented to person, place, and time.appears well-developed and well-nourished.   HEENT: Normocephalic, atraumatic, neck symmetrical, no nasal discharge   Eyes: conjunctivae/corneas clear, PERRL, EOM's intact  Lungs: clear to auscultation bilaterally, no dullness to percussion bilaterally  Heart: regular rate and rhythm without rub; no displacement of the PMI   Abdomen: soft, non-tender; bowel sounds normoactive; no organomegaly  Extremities: extremities symmetric; no clubbing, cyanosis, or edema  Integument: Skin color, texture, turgor normal; no rashes; hair distrubution normal  Neurologic:  Mental status: alert and awake, oriented x 4, comprehension, naming, and repetition intact. No right to left confusion .No dysarthria. MMSE 24/30  CN 2-12: pupils 4 mm bilaterally, reactive to  light. Fundi without papilledema. Visual fields full to confrontation. EOM full without nystagmus. Face sensation normal in all distributions. Face symmetric. Hearing grossly intact. Palate elevates well. Tongue midline without atrophy or fasciculations.  Motor: 5/5 all over  Sensory: intact in all modalities.  DTR's: 2+ all over.  Plantars: no tested.  Coordination: bilateral action and resting tremor  Gait: unable to stand up and walk without assistance.  Psychiatric: no pressured speech; normal affect; no evidence of impaired cognition     LABS:    Complete Blood Count  Lab Results   Component Value Date    RBC 3.68 (L) 10/28/2015    HGB 10.7 (L) 10/28/2015    HCT 32.3 (L) 10/28/2015    MCV 88 10/28/2015    MCH 29.1 10/28/2015    MCHC 33.1 10/28/2015    RDW 14.0 10/28/2015     10/28/2015    MPV 9.6 10/28/2015    GRAN 3.1 10/28/2015    GRAN 60.5 10/28/2015    LYMPH 1.5 10/28/2015    LYMPH 27.9 10/28/2015    MONO 0.4 10/28/2015    MONO 7.9 10/28/2015    EOS 0.2 10/28/2015    BASO 0.03 10/28/2015    EOSINOPHIL 2.9 10/28/2015    BASOPHIL 0.6 10/28/2015    DIFFMETHOD Automated 10/28/2015       Comprehensive Metabolic Panel  Lab Results   Component Value Date     (H) 11/28/2017    BUN 16 11/28/2017    CREATININE 1.1 11/28/2017     11/28/2017    K 4.3 11/28/2017     11/28/2017    PROT 8.2 11/28/2017    ALBUMIN 3.1 (L) 11/28/2017    BILITOT 0.5 11/28/2017    AST 14 11/28/2017    ALKPHOS 118 11/28/2017    CO2 31 (H) 11/28/2017    ALT 12 11/28/2017    ANIONGAP 6 (L) 11/28/2017    EGFRNONAA 49 (A) 11/28/2017    ESTGFRAFRICA 56 (A) 11/28/2017       TSH  Lab Results   Component Value Date    TSH 1.891 11/28/2017         Assessment:  78 y/o who carries a diagnosis of dementia with apparently neurobehavioral manifestations.   She is on namenda and is declining further neurological testing.      No diagnosis found.  There were no encounter diagnoses.      Plan:  Obtain old records  Continue  namenda.  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          Colt Landon MD

## 2017-12-20 NOTE — TELEPHONE ENCOUNTER
----- Message from Prince Kamara sent at 12/20/2017 10:48 AM CST -----  Contact: [phone]/[facility] home health nurse  Patient's wound on her left breast is getting bigger, has odor and covered with slough. Please call and advise

## 2017-12-20 NOTE — TELEPHONE ENCOUNTER
----- Message from Jess Peters sent at 12/19/2017  2:16 PM CST -----  Contact: Deb Physical Therapist [phone]  Calling to get verbal orders to extend physical therapy or the patient.

## 2017-12-20 NOTE — TELEPHONE ENCOUNTER
----- Message from Anne-Marie Berg sent at 12/20/2017 10:53 AM CST -----  Contact: home health nurse. [phone]  Nurse would like to speak with you about the wound having a mild odor and is much bigger and covered in slough. Please advise.         12/20/2017                1533  Attempted to contact the home health nurse regarding the above message. No answer. Left voicemail.

## 2017-12-20 NOTE — TELEPHONE ENCOUNTER
Spoke with  nurse who reports pt wound has odor but once dressing is removed odor goes away. Pt temp is 98.2 orally. Instructed to keep wound clean and dry and encourage daughter to keep all f/u appt with general surgeon.

## 2017-12-21 ENCOUNTER — TELEPHONE (OUTPATIENT)
Dept: FAMILY MEDICINE | Facility: CLINIC | Age: 77
End: 2017-12-21

## 2017-12-21 NOTE — TELEPHONE ENCOUNTER
Spoke with Deb. Deb was calling to get verbal orders to extend Cece P.T orders. Deb states she's going to have P.T 2 times a week for 4 more weeks.

## 2017-12-21 NOTE — TELEPHONE ENCOUNTER
----- Message from Stephanie Patricio sent at 12/21/2017  9:19 AM CST -----  Deb, physical therapist with [facility], returned your call regarding extra visits.   No. [phone]

## 2017-12-22 ENCOUNTER — HOSPITAL ENCOUNTER (OUTPATIENT)
Dept: RADIOLOGY | Facility: HOSPITAL | Age: 77
Discharge: HOME | End: 2017-12-22
Attending: SURGERY
Payer: MEDICARE

## 2017-12-22 ENCOUNTER — PATIENT OUTREACH (OUTPATIENT)
Dept: ADMINISTRATIVE | Facility: HOSPITAL | Age: 77
End: 2017-12-22

## 2017-12-22 DIAGNOSIS — C50.112 MALIGNANT NEOPLASM OF CENTRAL PORTION OF LEFT BREAST IN FEMALE, ESTROGEN RECEPTOR NEGATIVE: ICD-10-CM

## 2017-12-22 DIAGNOSIS — Z17.1 MALIGNANT NEOPLASM OF CENTRAL PORTION OF LEFT BREAST IN FEMALE, ESTROGEN RECEPTOR NEGATIVE: ICD-10-CM

## 2017-12-22 PROCEDURE — A9552 F18 FDG: HCPCS

## 2017-12-22 PROCEDURE — 78815 PET IMAGE W/CT SKULL-THIGH: CPT | Mod: 26,PI,, | Performed by: RADIOLOGY

## 2017-12-22 NOTE — PROGRESS NOTES
[facility] is committed to your overall health.  To help you get the most out of each of your visits, we will review your information to make sure you are up to date on all of your recommended tests and/or procedures.      Dr. Harper has found that your chart shows you may be due for:    Health Maintenance Due   Topic Date Due    Foot Exam  02/15/1950    Eye Exam  02/15/1950    TETANUS VACCINE  02/15/1959    DEXA SCAN  02/15/1981    Zoster Vaccine  02/15/2001    Pneumococcal (65+) (1 of 2 - PCV13) 02/15/2006    Influenza Vaccine  08/01/2017     The Dexa Scan (Osteoporosis Screening) is an enhanced form of x-ray technology that is used to measure bone loss. This a quick and painless procedure that involves lying on your back on an X-ray table so an area of your body can be scanned.    Please call [phone] to schedule your Dexa Scan.      If you have not had a diabetic eye exam within the last year, you may now do a retinal scan right here in the [facility]. This is a camera that will take a high quality picture of your retina, without dilation, and can detect signs of even very mild retinopathy. This can be scheduled for you immediately following the visit with your PCP. The scan will only take about 10 additional minutes, and your pictures will be sent to an [facility] for evaluation. The results will then be reported back to your PCP. We now offer this as a simple way to screen for Diabetic Retinopathy annually, without having to make a separate appointment. This is performed by clinical staff, is NOT a vision exam, and no recommendations or prescriptions will be provided on this day. You may schedule this for another day, but keep in mind that you may be charged an additional co-pay if this is not done in combination with your PCP visit.    People with diabetes or pre-diabetes can have an eye disease called diabetic retinopathy. This is when high blood sugar levels cause damage  to blood vessels in the retina. These blood vessels can swell and leak. Or they can close, stopping blood from passing through. Sometimes abnormal new blood vessels grow on the retina. All of these changes can steal your vision.    This is a very important step in your care, as early detection is the santamaria to prevention!      If you have had any of the above done at another facility, please bring the records or information with you so that your record at [facility] will be complete.  If you would like to schedule any of these, please contact me.      Brittany Albright LPN  Clinic Care Coordinator  Internal Medicine  [facility]/[place]  [address]  [place] [address]  [phone]

## 2017-12-26 LAB — POCT GLUCOSE: 125 MG/DL (ref 70–110)

## 2017-12-29 ENCOUNTER — OFFICE VISIT (OUTPATIENT)
Dept: SURGERY | Facility: CLINIC | Age: 77
End: 2017-12-29
Payer: MEDICARE

## 2017-12-29 VITALS
HEIGHT: 66 IN | TEMPERATURE: 98 F | SYSTOLIC BLOOD PRESSURE: 139 MMHG | DIASTOLIC BLOOD PRESSURE: 80 MMHG | HEART RATE: 65 BPM

## 2017-12-29 DIAGNOSIS — Z17.1 MALIGNANT NEOPLASM OF CENTRAL PORTION OF LEFT BREAST IN FEMALE, ESTROGEN RECEPTOR NEGATIVE: Primary | ICD-10-CM

## 2017-12-29 DIAGNOSIS — C50.112 MALIGNANT NEOPLASM OF CENTRAL PORTION OF LEFT BREAST IN FEMALE, ESTROGEN RECEPTOR NEGATIVE: Primary | ICD-10-CM

## 2017-12-29 DIAGNOSIS — C50.412 MALIGNANT NEOPLASM OF UPPER-OUTER QUADRANT OF LEFT BREAST IN FEMALE, ESTROGEN RECEPTOR NEGATIVE: ICD-10-CM

## 2017-12-29 DIAGNOSIS — Z17.1 MALIGNANT NEOPLASM OF UPPER-OUTER QUADRANT OF LEFT BREAST IN FEMALE, ESTROGEN RECEPTOR NEGATIVE: ICD-10-CM

## 2017-12-29 PROCEDURE — 99214 OFFICE O/P EST MOD 30 MIN: CPT | Mod: 57,S$PBB,, | Performed by: SURGERY

## 2017-12-29 PROCEDURE — 99999 PR PBB SHADOW E&M-EST. PATIENT-LVL IV: CPT | Mod: PBBFAC,,, | Performed by: SURGERY

## 2017-12-29 PROCEDURE — 99214 OFFICE O/P EST MOD 30 MIN: CPT | Mod: PBBFAC,PO | Performed by: SURGERY

## 2017-12-29 RX ORDER — HEPARIN SODIUM 5000 [USP'U]/ML
5000 INJECTION, SOLUTION INTRAVENOUS; SUBCUTANEOUS ONCE
Status: CANCELLED | OUTPATIENT
Start: 2017-12-29

## 2017-12-29 RX ORDER — HYDROMORPHONE HYDROCHLORIDE 2 MG/ML
0.5 INJECTION, SOLUTION INTRAMUSCULAR; INTRAVENOUS; SUBCUTANEOUS EVERY 4 HOURS PRN
Status: CANCELLED | OUTPATIENT
Start: 2017-12-29

## 2017-12-29 RX ORDER — HYDROMORPHONE HYDROCHLORIDE 2 MG/ML
1 INJECTION, SOLUTION INTRAMUSCULAR; INTRAVENOUS; SUBCUTANEOUS EVERY 4 HOURS PRN
Status: CANCELLED | OUTPATIENT
Start: 2017-12-29

## 2017-12-29 RX ORDER — SODIUM CHLORIDE, SODIUM LACTATE, POTASSIUM CHLORIDE, CALCIUM CHLORIDE 600; 310; 30; 20 MG/100ML; MG/100ML; MG/100ML; MG/100ML
INJECTION, SOLUTION INTRAVENOUS CONTINUOUS
Status: CANCELLED | OUTPATIENT
Start: 2017-12-29

## 2017-12-29 RX ORDER — ACETAMINOPHEN 325 MG/1
650 TABLET ORAL EVERY 8 HOURS PRN
Status: CANCELLED | OUTPATIENT
Start: 2017-12-29

## 2017-12-29 RX ORDER — ONDANSETRON 2 MG/ML
4 INJECTION INTRAMUSCULAR; INTRAVENOUS EVERY 12 HOURS PRN
Status: CANCELLED | OUTPATIENT
Start: 2017-12-29

## 2017-12-29 NOTE — PROGRESS NOTES
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  A mammogram obtained in  was abnormal, demonstrating indeterminate calcifications with grouped distribution in the posterior upper outer region of the right breast.  There is oval mass at the skin margins in the anterior upper outer region of the left breast.  A biopsy was obtained however she moved to [place] where she resumed care.  Since  this mass in her left breast has been growing in size.  It is being maintained by home health nurse Natasha prior to this refused any surgery.  She was told that chemotherapy was not an option given her comorbidities.  According to her daughter a PET scan was obtained in 2017, which did not show metastatic disease.     Menarche occurred at age 13.  Surgical menopause occurred at age 29.  She had a MAY/BSO.  No history of hormone replacement.  She is  with age first pregnancy 20.  No tobacco use history.  No family history of cancer.  No personal history of breast cancer or breast biopsy.         [facility] [phone] imaging  [facility] surgical       Chief Complaint   Patient presents with    Cancer       Review of patient's allergies indicates:  No Known Allergies         Current Outpatient Prescriptions   Medication Sig Dispense Refill     Current Outpatient Prescriptions on File Prior to Visit   Medication Sig Dispense Refill    amLODIPine (NORVASC) 5 MG tablet Take 1 tablet (5 mg total) by mouth once daily. 90 tablet 3    atorvastatin (LIPITOR) 40 MG tablet Take 1 tablet (40 mg total) by mouth once daily. 90 tablet 3    carvedilol (COREG) 12.5 MG tablet Take 1 tablet (12.5 mg total) by mouth 2 (two) times daily with meals. 180 tablet 3    escitalopram oxalate  (LEXAPRO) 10 MG tablet Take 1 tablet (10 mg total) by mouth once daily. 90 tablet 2    haloperidol (HALDOL) 2 MG tablet Take 1 tablet (2 mg total) by mouth once daily. 90 tablet 3    insulin degludec 100 unit/mL (3 mL) InPn Inject 30 Units into the skin once daily. 27 mL 1    levothyroxine (SYNTHROID) 50 MCG tablet Take 1 tablet (50 mcg total) by mouth once daily. 90 tablet 2    lisinopril (PRINIVIL,ZESTRIL) 20 MG tablet Take 1 tablet (20 mg total) by mouth once daily. 90 tablet 2    memantine (NAMENDA) 5 MG Tab Take 1 tablet (5 mg total) by mouth 2 (two) times daily. 90 tablet 2    pantoprazole (PROTONIX) 40 MG tablet Take 1 tablet (40 mg total) by mouth once daily. 90 tablet 2    ergocalciferol (ERGOCALCIFEROL) 50,000 unit Cap Take 1 capsule (50,000 Units total) by mouth every 7 days. 4 capsule 3    multivitamin capsule Take 1 capsule by mouth once daily.       No current facility-administered medications on file prior to visit.          Past Medical History     Past Medical History:   Diagnosis Date    Breast cancer     Diabetes mellitus     Diabetes mellitus type I     GERD (gastroesophageal reflux disease)     Hyperlipidemia     Hypertension     Hypothyroidism     Parkinson's disease        Past Surgical History     Past Surgical History:   Procedure Laterality Date    FINGER SURGERY      HYSTERECTOMY         Family History   History reviewed. No pertinent family history.      Review of Systems  Review of Systems   Constitutional: Negative for chills and fever.   HENT: Negative for congestion.    Eyes: Negative for blurred vision and double vision.   Respiratory: Negative for cough and shortness of breath.    Cardiovascular: Negative for chest pain and palpitations.   Gastrointestinal: Negative for abdominal pain, nausea and vomiting.   Genitourinary: Negative for dysuria and frequency.   Musculoskeletal: Negative for back pain and neck pain.   Skin: Negative for itching and rash.    Neurological: Negative for dizziness, seizures and headaches.   Endo/Heme/Allergies: Does not bruise/bleed easily.   Psychiatric/Behavioral: Negative for depression and substance abuse.         OBJECTIVE:     Vital Signs (Most Recent)  Vitals:    12/29/17 1055   BP: 139/80   Pulse: 65   Temp: 97.5 °F (36.4 °C)       Physical Exam   Constitutional: She is oriented to person, place, and time and well-developed, well-nourished, and in no distress. No distress.   HENT:   Head: Normocephalic and atraumatic.   Eyes: Conjunctivae are normal. No scleral icterus.   Neck: Normal range of motion.   Cardiovascular: Normal rate and intact distal pulses.    Pulmonary/Chest: Effort normal. No stridor. No respiratory distress. Left breast exhibits mass, skin change and tenderness. Left breast exhibits no inverted nipple and no nipple discharge. Breasts are asymmetrical.   Abdominal: Soft. She exhibits no distension. There is no tenderness.   Musculoskeletal: Normal range of motion. She exhibits no edema or tenderness.   Neurological: She is alert and oriented to person, place, and time.   Skin: Skin is warm. No rash noted. She is not diaphoretic. No erythema.   Psychiatric: Affect and judgment normal.             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  Triple negative     Plan:     Options for management were discussed with the patient and her family.     Patient is not a candidate for chemo and refusing radiation. Toilet mastectomy discussed for hygenic purposes, no intent for cure. This is her desire. Risks of surgery discussed including bleeding, infection, pain, scarring, wound complications, injury to local structures, and need for further surgery.  The patient demonstrated understanding of risks and consent signed.     Scheduled for 1/4 will use regional, case discussed with Dr. Foster

## 2018-01-02 ENCOUNTER — ANESTHESIA EVENT (OUTPATIENT)
Dept: SURGERY | Facility: HOSPITAL | Age: 78
End: 2018-01-02
Payer: MEDICARE

## 2018-01-02 NOTE — ANESTHESIA PREPROCEDURE EVALUATION
"                                                                                                             01/02/2018  Cece Buchanan is a 77 y.o., female with Stage T4 IIIB carcinoma, triple negative, left breast left UOQ  is scheduled for simple masectomy left breast under regional on 12/04/2017.    Per Dr. Solares note of 12/29/2017:  "Scheduled for 1/4 will use regional, case discussed with Dr. Foster."       Past Surgical History:   Procedure Laterality Date    FINGER SURGERY      HYSTERECTOMY           Anesthesia Evaluation    I have reviewed the Patient Summary Reports.    I have reviewed the Nursing Notes.   I have reviewed the Medications.     Review of Systems  Anesthesia Hx:  No problems with previous Anesthesia  History of prior surgery of interest to airway management or planning: Previous anesthesia: General  Denies Personal Hx of Anesthesia complications.   Social:  Non-Smoker, No Alcohol Use    Hematology/Oncology:  Hematology Normal      Current/Recent Cancer. (Stage T4 IIIB carcinoma left breast left UOQ. Triple negative; per Dr. Solares note: "Patient is not a candidate for chemo and refusing radiation..") Breast left   EENT/Dental:EENT/Dental Normal   Cardiovascular:   Exercise tolerance: poor Hypertension Denies Dysrhythmias.   Denies Angina. hyperlipidemia ECG has been reviewed.    Pulmonary:   Denies Shortness of breath.    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    Endocrine:   Diabetes, type 2, using insulin Hypothyroidism  Diabetes, Type 2 Diabetes , controlled by insulin. , most recent HgA1c value was 10.2 on 11/28/2017.    Dermatological:   Wound to left breast; pt daughter states worsened recently   Psych:   Psychiatric History depression  Psychotic Disorder and Hx of Psychotic Disorder. On haldol in the evening         Physical Exam  General:  Morbid Obesity " "Pt history provided by pt daughter via phone interview as pt unable to come to scheduled pre-op                    Anesthesia Plan  Type of Anesthesia, risks & benefits discussed:  Anesthesia Type:  regional, MAC, general  Patient's Preference:   Intra-op Monitoring Plan: standard ASA monitors  Intra-op Monitoring Plan Comments:   Post Op Pain Control Plan: per primary service following discharge from PACU and peripheral nerve block  Post Op Pain Control Plan Comments:   Induction:   IV  Beta Blocker:         Informed Consent: Patient understands risks and agrees with Anesthesia plan.  Questions answered.   ASA Score: 4     Day of Surgery Review of History & Physical:  There are no significant changes.      Anesthesia Plan Notes: Anesthesia consent will be obtained prior to procedure on 1/04/2018.    Per Dr. Solares note of 12/29/2017:  "Scheduled for 1/4 will use regional, case discussed with Dr. Foster."           Ready For Surgery From Anesthesia Perspective.       "

## 2018-01-04 ENCOUNTER — HOSPITAL ENCOUNTER (OUTPATIENT)
Facility: HOSPITAL | Age: 78
LOS: 1 days | Discharge: HOME | End: 2018-01-05
Attending: SURGERY | Admitting: SURGERY
Payer: MEDICARE

## 2018-01-04 ENCOUNTER — ANESTHESIA (OUTPATIENT)
Dept: SURGERY | Facility: HOSPITAL | Age: 78
End: 2018-01-04
Payer: MEDICARE

## 2018-01-04 ENCOUNTER — SURGERY (OUTPATIENT)
Age: 78
End: 2018-01-04

## 2018-01-04 DIAGNOSIS — C50.112 MALIGNANT NEOPLASM OF CENTRAL PORTION OF LEFT BREAST IN FEMALE, ESTROGEN RECEPTOR NEGATIVE: Primary | ICD-10-CM

## 2018-01-04 DIAGNOSIS — Z17.1 MALIGNANT NEOPLASM OF CENTRAL PORTION OF LEFT BREAST IN FEMALE, ESTROGEN RECEPTOR NEGATIVE: Primary | ICD-10-CM

## 2018-01-04 DIAGNOSIS — Z01.818 PRE-OP TESTING: ICD-10-CM

## 2018-01-04 LAB
ANION GAP SERPL CALC-SCNC: 10 MMOL/L
BUN SERPL-MCNC: 14 MG/DL
CALCIUM SERPL-MCNC: 9.1 MG/DL
CHLORIDE SERPL-SCNC: 103 MMOL/L
CO2 SERPL-SCNC: 29 MMOL/L
CREAT SERPL-MCNC: 0.9 MG/DL
EST. GFR  (AFRICAN AMERICAN): >60 ML/MIN/1.73 M^2
EST. GFR  (NON AFRICAN AMERICAN): >60 ML/MIN/1.73 M^2
GLUCOSE SERPL-MCNC: 120 MG/DL
POCT GLUCOSE: 116 MG/DL (ref 70–110)
POCT GLUCOSE: 187 MG/DL (ref 70–110)
POTASSIUM SERPL-SCNC: 5 MMOL/L
SODIUM SERPL-SCNC: 142 MMOL/L

## 2018-01-04 PROCEDURE — 19303 MAST SIMPLE COMPLETE: CPT | Mod: LT,,, | Performed by: SURGERY

## 2018-01-04 PROCEDURE — 63600175 PHARM REV CODE 636 W HCPCS: Performed by: NURSE ANESTHETIST, CERTIFIED REGISTERED

## 2018-01-04 PROCEDURE — 37000008 HC ANESTHESIA 1ST 15 MINUTES: Performed by: SURGERY

## 2018-01-04 PROCEDURE — 63600175 PHARM REV CODE 636 W HCPCS: Performed by: ANESTHESIOLOGY

## 2018-01-04 PROCEDURE — C1729 CATH, DRAINAGE: HCPCS | Performed by: SURGERY

## 2018-01-04 PROCEDURE — 36000706: Performed by: SURGERY

## 2018-01-04 PROCEDURE — 25000003 PHARM REV CODE 250: Performed by: STUDENT IN AN ORGANIZED HEALTH CARE EDUCATION/TRAINING PROGRAM

## 2018-01-04 PROCEDURE — 88307 TISSUE EXAM BY PATHOLOGIST: CPT | Mod: 26,,, | Performed by: PATHOLOGY

## 2018-01-04 PROCEDURE — 71000033 HC RECOVERY, INTIAL HOUR: Performed by: SURGERY

## 2018-01-04 PROCEDURE — 93010 ELECTROCARDIOGRAM REPORT: CPT | Mod: ,,, | Performed by: INTERNAL MEDICINE

## 2018-01-04 PROCEDURE — 63600175 PHARM REV CODE 636 W HCPCS

## 2018-01-04 PROCEDURE — 27201423 OPTIME MED/SURG SUP & DEVICES STERILE SUPPLY: Performed by: SURGERY

## 2018-01-04 PROCEDURE — 64450 NJX AA&/STRD OTHER PN/BRANCH: CPT | Performed by: ANESTHESIOLOGY

## 2018-01-04 PROCEDURE — 80048 BASIC METABOLIC PNL TOTAL CA: CPT

## 2018-01-04 PROCEDURE — 37000009 HC ANESTHESIA EA ADD 15 MINS: Performed by: SURGERY

## 2018-01-04 PROCEDURE — 63600175 PHARM REV CODE 636 W HCPCS: Performed by: STUDENT IN AN ORGANIZED HEALTH CARE EDUCATION/TRAINING PROGRAM

## 2018-01-04 PROCEDURE — 25000003 PHARM REV CODE 250: Performed by: SURGERY

## 2018-01-04 PROCEDURE — 25000003 PHARM REV CODE 250: Performed by: NURSE PRACTITIONER

## 2018-01-04 PROCEDURE — 36415 COLL VENOUS BLD VENIPUNCTURE: CPT

## 2018-01-04 PROCEDURE — 36000707: Performed by: SURGERY

## 2018-01-04 PROCEDURE — 88305 TISSUE EXAM BY PATHOLOGIST: CPT | Mod: 26,,, | Performed by: PATHOLOGY

## 2018-01-04 PROCEDURE — 38525 BIOPSY/REMOVAL LYMPH NODES: CPT | Mod: 51,LT,, | Performed by: SURGERY

## 2018-01-04 PROCEDURE — 88305 TISSUE EXAM BY PATHOLOGIST: CPT | Performed by: PATHOLOGY

## 2018-01-04 PROCEDURE — 93005 ELECTROCARDIOGRAM TRACING: CPT | Mod: 59

## 2018-01-04 PROCEDURE — 88342 IMHCHEM/IMCYTCHM 1ST ANTB: CPT | Mod: 26,,, | Performed by: PATHOLOGY

## 2018-01-04 PROCEDURE — 71000039 HC RECOVERY, EACH ADD'L HOUR: Performed by: SURGERY

## 2018-01-04 RX ORDER — ONDANSETRON 2 MG/ML
4 INJECTION INTRAMUSCULAR; INTRAVENOUS EVERY 6 HOURS PRN
Status: DISCONTINUED | OUTPATIENT
Start: 2018-01-04 | End: 2018-01-05 | Stop reason: HOSPADM

## 2018-01-04 RX ORDER — HYDROMORPHONE HYDROCHLORIDE 2 MG/ML
0.5 INJECTION, SOLUTION INTRAMUSCULAR; INTRAVENOUS; SUBCUTANEOUS EVERY 4 HOURS PRN
Status: DISCONTINUED | OUTPATIENT
Start: 2018-01-04 | End: 2018-01-05 | Stop reason: HOSPADM

## 2018-01-04 RX ORDER — MIDAZOLAM HYDROCHLORIDE 1 MG/ML
INJECTION, SOLUTION INTRAMUSCULAR; INTRAVENOUS
Status: DISCONTINUED | OUTPATIENT
Start: 2018-01-04 | End: 2018-01-04

## 2018-01-04 RX ORDER — ONDANSETRON 2 MG/ML
4 INJECTION INTRAMUSCULAR; INTRAVENOUS EVERY 12 HOURS PRN
Status: DISCONTINUED | OUTPATIENT
Start: 2018-01-04 | End: 2018-01-05 | Stop reason: HOSPADM

## 2018-01-04 RX ORDER — DIPHENHYDRAMINE HYDROCHLORIDE 50 MG/ML
12.5 INJECTION INTRAMUSCULAR; INTRAVENOUS EVERY 6 HOURS PRN
Status: DISCONTINUED | OUTPATIENT
Start: 2018-01-04 | End: 2018-01-04 | Stop reason: HOSPADM

## 2018-01-04 RX ORDER — PHENYLEPHRINE HYDROCHLORIDE 10 MG/ML
INJECTION INTRAVENOUS
Status: DISCONTINUED | OUTPATIENT
Start: 2018-01-04 | End: 2018-01-04

## 2018-01-04 RX ORDER — ENOXAPARIN SODIUM 100 MG/ML
40 INJECTION SUBCUTANEOUS
Status: DISCONTINUED | OUTPATIENT
Start: 2018-01-05 | End: 2018-01-05 | Stop reason: HOSPADM

## 2018-01-04 RX ORDER — CARVEDILOL 12.5 MG/1
12.5 TABLET ORAL 2 TIMES DAILY WITH MEALS
Status: DISCONTINUED | OUTPATIENT
Start: 2018-01-04 | End: 2018-01-05 | Stop reason: HOSPADM

## 2018-01-04 RX ORDER — AMLODIPINE BESYLATE 5 MG/1
5 TABLET ORAL DAILY
Status: DISCONTINUED | OUTPATIENT
Start: 2018-01-04 | End: 2018-01-05 | Stop reason: HOSPADM

## 2018-01-04 RX ORDER — IBUPROFEN 200 MG
24 TABLET ORAL
Status: DISCONTINUED | OUTPATIENT
Start: 2018-01-04 | End: 2018-01-05 | Stop reason: HOSPADM

## 2018-01-04 RX ORDER — SODIUM CHLORIDE 0.9 % (FLUSH) 0.9 %
3 SYRINGE (ML) INJECTION
Status: DISCONTINUED | OUTPATIENT
Start: 2018-01-04 | End: 2018-01-04 | Stop reason: HOSPADM

## 2018-01-04 RX ORDER — ONDANSETRON 2 MG/ML
4 INJECTION INTRAMUSCULAR; INTRAVENOUS DAILY PRN
Status: DISCONTINUED | OUTPATIENT
Start: 2018-01-04 | End: 2018-01-04 | Stop reason: HOSPADM

## 2018-01-04 RX ORDER — GLUCAGON 1 MG
1 KIT INJECTION
Status: DISCONTINUED | OUTPATIENT
Start: 2018-01-04 | End: 2018-01-05 | Stop reason: HOSPADM

## 2018-01-04 RX ORDER — PROPOFOL 10 MG/ML
VIAL (ML) INTRAVENOUS CONTINUOUS PRN
Status: DISCONTINUED | OUTPATIENT
Start: 2018-01-04 | End: 2018-01-04

## 2018-01-04 RX ORDER — OXYCODONE AND ACETAMINOPHEN 10; 325 MG/1; MG/1
1 TABLET ORAL EVERY 4 HOURS PRN
Status: DISCONTINUED | OUTPATIENT
Start: 2018-01-04 | End: 2018-01-05 | Stop reason: HOSPADM

## 2018-01-04 RX ORDER — LEVOTHYROXINE SODIUM 50 UG/1
50 TABLET ORAL DAILY
Status: DISCONTINUED | OUTPATIENT
Start: 2018-01-05 | End: 2018-01-05 | Stop reason: HOSPADM

## 2018-01-04 RX ORDER — BUPIVACAINE HYDROCHLORIDE 2.5 MG/ML
INJECTION, SOLUTION EPIDURAL; INFILTRATION; INTRACAUDAL
Status: DISCONTINUED | OUTPATIENT
Start: 2018-01-04 | End: 2018-01-04 | Stop reason: HOSPADM

## 2018-01-04 RX ORDER — IBUPROFEN 200 MG
16 TABLET ORAL
Status: DISCONTINUED | OUTPATIENT
Start: 2018-01-04 | End: 2018-01-05 | Stop reason: HOSPADM

## 2018-01-04 RX ORDER — HYDROMORPHONE HYDROCHLORIDE 2 MG/ML
1 INJECTION, SOLUTION INTRAMUSCULAR; INTRAVENOUS; SUBCUTANEOUS EVERY 4 HOURS PRN
Status: DISCONTINUED | OUTPATIENT
Start: 2018-01-04 | End: 2018-01-05 | Stop reason: HOSPADM

## 2018-01-04 RX ORDER — PROMETHAZINE HYDROCHLORIDE 25 MG/1
25 TABLET ORAL EVERY 6 HOURS PRN
Status: DISCONTINUED | OUTPATIENT
Start: 2018-01-04 | End: 2018-01-05 | Stop reason: HOSPADM

## 2018-01-04 RX ORDER — ACETAMINOPHEN 325 MG/1
650 TABLET ORAL EVERY 8 HOURS PRN
Status: DISCONTINUED | OUTPATIENT
Start: 2018-01-04 | End: 2018-01-05 | Stop reason: HOSPADM

## 2018-01-04 RX ORDER — PANTOPRAZOLE SODIUM 40 MG/1
40 TABLET, DELAYED RELEASE ORAL DAILY
Status: DISCONTINUED | OUTPATIENT
Start: 2018-01-04 | End: 2018-01-05 | Stop reason: HOSPADM

## 2018-01-04 RX ORDER — MEMANTINE HYDROCHLORIDE 5 MG/1
5 TABLET ORAL 2 TIMES DAILY
Status: DISCONTINUED | OUTPATIENT
Start: 2018-01-04 | End: 2018-01-05 | Stop reason: HOSPADM

## 2018-01-04 RX ORDER — HALOPERIDOL 1 MG/1
2 TABLET ORAL DAILY
Status: DISCONTINUED | OUTPATIENT
Start: 2018-01-04 | End: 2018-01-05 | Stop reason: HOSPADM

## 2018-01-04 RX ORDER — HYDROMORPHONE HYDROCHLORIDE 2 MG/ML
0.5 INJECTION, SOLUTION INTRAMUSCULAR; INTRAVENOUS; SUBCUTANEOUS
Status: DISCONTINUED | OUTPATIENT
Start: 2018-01-04 | End: 2018-01-05 | Stop reason: HOSPADM

## 2018-01-04 RX ORDER — SODIUM CHLORIDE, SODIUM LACTATE, POTASSIUM CHLORIDE, CALCIUM CHLORIDE 600; 310; 30; 20 MG/100ML; MG/100ML; MG/100ML; MG/100ML
INJECTION, SOLUTION INTRAVENOUS CONTINUOUS
Status: DISCONTINUED | OUTPATIENT
Start: 2018-01-04 | End: 2018-01-04

## 2018-01-04 RX ORDER — ATORVASTATIN CALCIUM 40 MG/1
40 TABLET, FILM COATED ORAL DAILY
Status: DISCONTINUED | OUTPATIENT
Start: 2018-01-04 | End: 2018-01-05 | Stop reason: HOSPADM

## 2018-01-04 RX ORDER — HYDRALAZINE HYDROCHLORIDE 20 MG/ML
INJECTION INTRAMUSCULAR; INTRAVENOUS
Status: COMPLETED
Start: 2018-01-04 | End: 2018-01-04

## 2018-01-04 RX ORDER — LIDOCAINE HCL/PF 100 MG/5ML
SYRINGE (ML) INTRAVENOUS
Status: DISCONTINUED | OUTPATIENT
Start: 2018-01-04 | End: 2018-01-04

## 2018-01-04 RX ORDER — CEFAZOLIN SODIUM 1 G/3ML
INJECTION, POWDER, FOR SOLUTION INTRAMUSCULAR; INTRAVENOUS
Status: DISCONTINUED | OUTPATIENT
Start: 2018-01-04 | End: 2018-01-04

## 2018-01-04 RX ORDER — PROPOFOL 10 MG/ML
VIAL (ML) INTRAVENOUS
Status: DISCONTINUED | OUTPATIENT
Start: 2018-01-04 | End: 2018-01-04

## 2018-01-04 RX ORDER — OXYCODONE AND ACETAMINOPHEN 5; 325 MG/1; MG/1
1 TABLET ORAL EVERY 4 HOURS PRN
Status: DISCONTINUED | OUTPATIENT
Start: 2018-01-04 | End: 2018-01-05 | Stop reason: HOSPADM

## 2018-01-04 RX ORDER — HYDROMORPHONE HYDROCHLORIDE 2 MG/ML
0.5 INJECTION, SOLUTION INTRAMUSCULAR; INTRAVENOUS; SUBCUTANEOUS EVERY 5 MIN PRN
Status: DISCONTINUED | OUTPATIENT
Start: 2018-01-04 | End: 2018-01-04 | Stop reason: HOSPADM

## 2018-01-04 RX ORDER — SODIUM CHLORIDE 0.9 % (FLUSH) 0.9 %
3 SYRINGE (ML) INJECTION
Status: DISCONTINUED | OUTPATIENT
Start: 2018-01-04 | End: 2018-01-05 | Stop reason: HOSPADM

## 2018-01-04 RX ORDER — SODIUM CHLORIDE, SODIUM LACTATE, POTASSIUM CHLORIDE, CALCIUM CHLORIDE 600; 310; 30; 20 MG/100ML; MG/100ML; MG/100ML; MG/100ML
INJECTION, SOLUTION INTRAVENOUS CONTINUOUS
Status: DISCONTINUED | OUTPATIENT
Start: 2018-01-04 | End: 2018-01-05 | Stop reason: HOSPADM

## 2018-01-04 RX ORDER — INSULIN ASPART 100 [IU]/ML
1-10 INJECTION, SOLUTION INTRAVENOUS; SUBCUTANEOUS
Status: DISCONTINUED | OUTPATIENT
Start: 2018-01-04 | End: 2018-01-05 | Stop reason: HOSPADM

## 2018-01-04 RX ORDER — HYDRALAZINE HYDROCHLORIDE 20 MG/ML
5 INJECTION INTRAMUSCULAR; INTRAVENOUS EVERY 6 HOURS PRN
Status: DISCONTINUED | OUTPATIENT
Start: 2018-01-04 | End: 2018-01-05 | Stop reason: HOSPADM

## 2018-01-04 RX ORDER — DIPHENHYDRAMINE HYDROCHLORIDE 50 MG/ML
25 INJECTION INTRAMUSCULAR; INTRAVENOUS EVERY 4 HOURS PRN
Status: DISCONTINUED | OUTPATIENT
Start: 2018-01-04 | End: 2018-01-05 | Stop reason: HOSPADM

## 2018-01-04 RX ORDER — ESCITALOPRAM OXALATE 10 MG/1
10 TABLET ORAL DAILY
Status: DISCONTINUED | OUTPATIENT
Start: 2018-01-04 | End: 2018-01-05 | Stop reason: HOSPADM

## 2018-01-04 RX ORDER — FENTANYL CITRATE 50 UG/ML
INJECTION, SOLUTION INTRAMUSCULAR; INTRAVENOUS
Status: DISCONTINUED | OUTPATIENT
Start: 2018-01-04 | End: 2018-01-04

## 2018-01-04 RX ADMIN — FENTANYL CITRATE 25 MCG: 50 INJECTION, SOLUTION INTRAMUSCULAR; INTRAVENOUS at 01:01

## 2018-01-04 RX ADMIN — CEFAZOLIN 3 G: 330 INJECTION, POWDER, FOR SOLUTION INTRAMUSCULAR; INTRAVENOUS at 11:01

## 2018-01-04 RX ADMIN — LIDOCAINE HYDROCHLORIDE 80 MG: 20 INJECTION, SOLUTION INTRAVENOUS at 11:01

## 2018-01-04 RX ADMIN — HYDROMORPHONE HYDROCHLORIDE 0.5 MG: 2 INJECTION INTRAMUSCULAR; INTRAVENOUS; SUBCUTANEOUS at 02:01

## 2018-01-04 RX ADMIN — LIDOCAINE HYDROCHLORIDE 30 ML: 10; .005 INJECTION, SOLUTION EPIDURAL; INFILTRATION; INTRACAUDAL; PERINEURAL at 12:01

## 2018-01-04 RX ADMIN — PROPOFOL 75 MCG/KG/MIN: 10 INJECTION, EMULSION INTRAVENOUS at 11:01

## 2018-01-04 RX ADMIN — HALOPERIDOL 2 MG: 1 TABLET ORAL at 03:01

## 2018-01-04 RX ADMIN — HYDRALAZINE HYDROCHLORIDE 5 MG: 20 INJECTION INTRAMUSCULAR; INTRAVENOUS at 05:01

## 2018-01-04 RX ADMIN — ONDANSETRON 4 MG: 2 INJECTION INTRAMUSCULAR; INTRAVENOUS at 06:01

## 2018-01-04 RX ADMIN — BUPIVACAINE HYDROCHLORIDE 30 ML: 2.5 INJECTION, SOLUTION EPIDURAL; INFILTRATION; INTRACAUDAL; PERINEURAL at 12:01

## 2018-01-04 RX ADMIN — MIDAZOLAM 3 MG: 1 INJECTION INTRAMUSCULAR; INTRAVENOUS at 11:01

## 2018-01-04 RX ADMIN — FENTANYL CITRATE 25 MCG: 50 INJECTION, SOLUTION INTRAMUSCULAR; INTRAVENOUS at 11:01

## 2018-01-04 RX ADMIN — MIDAZOLAM 2 MG: 1 INJECTION INTRAMUSCULAR; INTRAVENOUS at 10:01

## 2018-01-04 RX ADMIN — PHENYLEPHRINE HYDROCHLORIDE 50 MCG: 10 INJECTION INTRAVENOUS at 01:01

## 2018-01-04 RX ADMIN — PROPOFOL 30 MG: 10 INJECTION, EMULSION INTRAVENOUS at 11:01

## 2018-01-04 RX ADMIN — INSULIN ASPART 1 UNITS: 100 INJECTION, SOLUTION INTRAVENOUS; SUBCUTANEOUS at 09:01

## 2018-01-04 RX ADMIN — SODIUM CHLORIDE, SODIUM LACTATE, POTASSIUM CHLORIDE, AND CALCIUM CHLORIDE: 600; 310; 30; 20 INJECTION, SOLUTION INTRAVENOUS at 04:01

## 2018-01-04 RX ADMIN — ATORVASTATIN CALCIUM 40 MG: 40 TABLET, FILM COATED ORAL at 03:01

## 2018-01-04 RX ADMIN — DIPHENHYDRAMINE HYDROCHLORIDE 25 MG: 50 INJECTION, SOLUTION INTRAMUSCULAR; INTRAVENOUS at 09:01

## 2018-01-04 RX ADMIN — PHENYLEPHRINE HYDROCHLORIDE 200 MCG: 10 INJECTION INTRAVENOUS at 12:01

## 2018-01-04 RX ADMIN — PHENYLEPHRINE HYDROCHLORIDE 100 MCG: 10 INJECTION INTRAVENOUS at 11:01

## 2018-01-04 RX ADMIN — CARVEDILOL 12.5 MG: 12.5 TABLET, FILM COATED ORAL at 04:01

## 2018-01-04 RX ADMIN — PROPOFOL 100 MCG/KG/MIN: 10 INJECTION, EMULSION INTRAVENOUS at 11:01

## 2018-01-04 RX ADMIN — MEMANTINE HYDROCHLORIDE 5 MG: 5 TABLET ORAL at 09:01

## 2018-01-04 RX ADMIN — SODIUM CHLORIDE, SODIUM LACTATE, POTASSIUM CHLORIDE, AND CALCIUM CHLORIDE: .6; .31; .03; .02 INJECTION, SOLUTION INTRAVENOUS at 10:01

## 2018-01-04 RX ADMIN — PHENYLEPHRINE HYDROCHLORIDE 200 MCG: 10 INJECTION INTRAVENOUS at 11:01

## 2018-01-04 NOTE — PLAN OF CARE
Problem: Patient Care Overview  Goal: Plan of Care Review  Outcome: Ongoing (interventions implemented as appropriate)  Patient in recovery, vss, pain meds given to keep pain tolerable, ice to incision, family notified, patient is waiting on bed

## 2018-01-04 NOTE — INTERVAL H&P NOTE
The patient has been examined and the H&P has been reviewed:    I concur with the findings and no changes have occurred since H&P was written.    Anesthesia/Surgery risks, benefits and alternative options discussed and understood by patient/family.          Active Hospital Problems    Diagnosis  POA    Malignant neoplasm of central portion of left breast in female, estrogen receptor negative [C50.112, Z17.1]  Not Applicable      Resolved Hospital Problems    Diagnosis Date Resolved POA   No resolved problems to display.

## 2018-01-04 NOTE — H&P (VIEW-ONLY)
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  A mammogram obtained in  was abnormal, demonstrating indeterminate calcifications with grouped distribution in the posterior upper outer region of the right breast.  There is oval mass at the skin margins in the anterior upper outer region of the left breast.  A biopsy was obtained however she moved to [place] where she resumed care.  Since  this mass in her left breast has been growing in size.  It is being maintained by home health nurse Natasha prior to this refused any surgery.  She was told that chemotherapy was not an option given her comorbidities.  According to her daughter a PET scan was obtained in 2017, which did not show metastatic disease.     Menarche occurred at age 13.  Surgical menopause occurred at age 29.  She had a MAY/BSO.  No history of hormone replacement.  She is  with age first pregnancy 20.  No tobacco use history.  No family history of cancer.  No personal history of breast cancer or breast biopsy.         [facility] [phone] imaging  [facility] surgical       Chief Complaint   Patient presents with    Cancer       Review of patient's allergies indicates:  No Known Allergies         Current Outpatient Prescriptions   Medication Sig Dispense Refill     Current Outpatient Prescriptions on File Prior to Visit   Medication Sig Dispense Refill    amLODIPine (NORVASC) 5 MG tablet Take 1 tablet (5 mg total) by mouth once daily. 90 tablet 3    atorvastatin (LIPITOR) 40 MG tablet Take 1 tablet (40 mg total) by mouth once daily. 90 tablet 3    carvedilol (COREG) 12.5 MG tablet Take 1 tablet (12.5 mg total) by mouth 2 (two) times daily with meals. 180 tablet 3    escitalopram oxalate  (LEXAPRO) 10 MG tablet Take 1 tablet (10 mg total) by mouth once daily. 90 tablet 2    haloperidol (HALDOL) 2 MG tablet Take 1 tablet (2 mg total) by mouth once daily. 90 tablet 3    insulin degludec 100 unit/mL (3 mL) InPn Inject 30 Units into the skin once daily. 27 mL 1    levothyroxine (SYNTHROID) 50 MCG tablet Take 1 tablet (50 mcg total) by mouth once daily. 90 tablet 2    lisinopril (PRINIVIL,ZESTRIL) 20 MG tablet Take 1 tablet (20 mg total) by mouth once daily. 90 tablet 2    memantine (NAMENDA) 5 MG Tab Take 1 tablet (5 mg total) by mouth 2 (two) times daily. 90 tablet 2    pantoprazole (PROTONIX) 40 MG tablet Take 1 tablet (40 mg total) by mouth once daily. 90 tablet 2    ergocalciferol (ERGOCALCIFEROL) 50,000 unit Cap Take 1 capsule (50,000 Units total) by mouth every 7 days. 4 capsule 3    multivitamin capsule Take 1 capsule by mouth once daily.       No current facility-administered medications on file prior to visit.          Past Medical History     Past Medical History:   Diagnosis Date    Breast cancer     Diabetes mellitus     Diabetes mellitus type I     GERD (gastroesophageal reflux disease)     Hyperlipidemia     Hypertension     Hypothyroidism     Parkinson's disease        Past Surgical History     Past Surgical History:   Procedure Laterality Date    FINGER SURGERY      HYSTERECTOMY         Family History   History reviewed. No pertinent family history.      Review of Systems  Review of Systems   Constitutional: Negative for chills and fever.   HENT: Negative for congestion.    Eyes: Negative for blurred vision and double vision.   Respiratory: Negative for cough and shortness of breath.    Cardiovascular: Negative for chest pain and palpitations.   Gastrointestinal: Negative for abdominal pain, nausea and vomiting.   Genitourinary: Negative for dysuria and frequency.   Musculoskeletal: Negative for back pain and neck pain.   Skin: Negative for itching and rash.    Neurological: Negative for dizziness, seizures and headaches.   Endo/Heme/Allergies: Does not bruise/bleed easily.   Psychiatric/Behavioral: Negative for depression and substance abuse.         OBJECTIVE:     Vital Signs (Most Recent)  Vitals:    12/29/17 1055   BP: 139/80   Pulse: 65   Temp: 97.5 °F (36.4 °C)       Physical Exam   Constitutional: She is oriented to person, place, and time and well-developed, well-nourished, and in no distress. No distress.   HENT:   Head: Normocephalic and atraumatic.   Eyes: Conjunctivae are normal. No scleral icterus.   Neck: Normal range of motion.   Cardiovascular: Normal rate and intact distal pulses.    Pulmonary/Chest: Effort normal. No stridor. No respiratory distress. Left breast exhibits mass, skin change and tenderness. Left breast exhibits no inverted nipple and no nipple discharge. Breasts are asymmetrical.   Abdominal: Soft. She exhibits no distension. There is no tenderness.   Musculoskeletal: Normal range of motion. She exhibits no edema or tenderness.   Neurological: She is alert and oriented to person, place, and time.   Skin: Skin is warm. No rash noted. She is not diaphoretic. No erythema.   Psychiatric: Affect and judgment normal.             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  Triple negative     Plan:     Options for management were discussed with the patient and her family.     Patient is not a candidate for chemo and refusing radiation. Toilet mastectomy discussed for hygenic purposes, no intent for cure. This is her desire. Risks of surgery discussed including bleeding, infection, pain, scarring, wound complications, injury to local structures, and need for further surgery.  The patient demonstrated understanding of risks and consent signed.     Scheduled for 1/4 will use regional, case discussed with Dr. Foster

## 2018-01-04 NOTE — PLAN OF CARE
1044: timeout completed for left peravertebral/pec nerve block with Dr Foster, Dr Serna, Dr Mcfadden; consents x 2 verified    1049: nerve block started; vss; no signs of discomfort noted    1102: nerve block finished; vss; pt tolerated well.

## 2018-01-04 NOTE — BRIEF OP NOTE
[facility]  Brief Operative Note    SUMMARY     Surgery Date: 1/4/2018     Surgeon(s) and Role:     * Aleisha Solares MD - Primary    Assisting Surgeon: Braulio Yeung MD - RES    Pre-op Diagnosis:  Malignant neoplasm of central portion of left breast in female, estrogen receptor negative [C50.112, Z17.1]    Post-op Diagnosis:  Post-Op Diagnosis Codes:     * Malignant neoplasm of central portion of left breast in female, estrogen receptor negative [C50.112, Z17.1]    Procedure(s) (LRB):  MASTECTOMY-SIMPLE (Left)    Anesthesia: Regional    Description of Procedure: Left simple mastectomy     Description of the findings of the procedure: Left simple mastectomy - toilet mastectomy for non-healing, fungating breast mass.     Estimated Blood Loss: * No values recorded between 1/4/2018 11:44 AM and 1/4/2018  1:25 PM *         Specimens:   Specimen (12h ago through future)    Start     Ordered    01/04/18 1324  Specimen to Pathology - Surgery  Once     Comments:  1) Left breast tissue2) Left axillary lymph node      01/04/18 1324    Pending  Specimen to Pathology - Surgery  Once     Comments:  1) left breast      Pending

## 2018-01-04 NOTE — ANESTHESIA POSTPROCEDURE EVALUATION
"Anesthesia Post Evaluation    Patient: Cece Buchanan    Procedure(s) Performed: Procedure(s) (LRB):  MASTECTOMY-SIMPLE (Left)    [facility] Anesthesia Post Op Evaluation    Visit Vitals  BP (!) 172/79   Pulse (!) 56   Temp 36.2 °C (97.2 °F) (Skin)   Resp 15   Ht 5' 5" (1.651 m)   Wt 120.2 kg (265 lb)   SpO2 99%   Breastfeeding? No   BMI 44.10 kg/m²       Pain/Lyndon Score: Pain Assessment Performed: Yes (1/4/2018  1:35 PM)  Presence of Pain: complains of pain/discomfort (1/4/2018  1:35 PM)  Pain Rating Prior to Med Admin: 5 (1/4/2018  2:24 PM)  Lyndon Score: 8 (1/4/2018  1:35 PM)  Modified Lyndon Score: 17 (1/4/2018  1:35 PM)      "

## 2018-01-04 NOTE — ANESTHESIA POSTPROCEDURE EVALUATION
"Anesthesia Post Evaluation    Patient: Cece Buchanan    Procedure(s) Performed: Procedure(s) (LRB):  MASTECTOMY-SIMPLE (Left)    Final Anesthesia Type: general  Patient location during evaluation: PACU  Patient participation: Yes- Able to Participate  Level of consciousness: awake and alert, oriented and awake  Post-procedure vital signs: reviewed and stable  Pain management: adequate  Airway patency: patent  PONV status at discharge: No PONV  Anesthetic complications: no      Cardiovascular status: blood pressure returned to baseline  Respiratory status: unassisted and room air  Hydration status: euvolemic  Follow-up not needed.        Visit Vitals  BP (!) 172/79   Pulse (!) 56   Temp 36.2 °C (97.2 °F) (Skin)   Resp 15   Ht 5' 5" (1.651 m)   Wt 120.2 kg (265 lb)   SpO2 99%   Breastfeeding? No   BMI 44.10 kg/m²       Pain/Lyndon Score: Pain Assessment Performed: Yes (1/4/2018  1:35 PM)  Presence of Pain: complains of pain/discomfort (1/4/2018  1:35 PM)  Pain Rating Prior to Med Admin: 5 (1/4/2018  2:24 PM)  Lyndon Score: 8 (1/4/2018  1:35 PM)  Modified Lyndon Score: 17 (1/4/2018  1:35 PM)      "

## 2018-01-04 NOTE — TRANSFER OF CARE
"Anesthesia Transfer of Care Note    Patient: Cece Buchanan    Procedure(s) Performed: Procedure(s) (LRB):  MASTECTOMY-SIMPLE (Left)    Patient location: PACU    Anesthesia Type: MAC and regional    Transport from OR: Transported from OR on 6-10 L/min O2 by face mask with adequate spontaneous ventilation    Post pain: adequate analgesia    Post assessment: no apparent anesthetic complications and tolerated procedure well    Post vital signs: stable    Level of consciousness: awake, alert and oriented    Nausea/Vomiting: no nausea/vomiting    Complications: none    Transfer of care protocol was followed      Last vitals:   Visit Vitals  BP (!) 184/84   Pulse 65   Temp 36.3 °C (97.3 °F) (Temporal)   Resp 16   Ht 5' 5" (1.651 m)   Wt 120.2 kg (265 lb)   SpO2 100%   Breastfeeding? No   BMI 44.10 kg/m²     "

## 2018-01-04 NOTE — PLAN OF CARE
[facility]    HOME HEALTH ORDERS  FACE TO FACE ENCOUNTER    Patient Name: Cece Buchanan  YOB: 1940    PCP: Andrew Garcia DO   PCP Address: [address] [facility] / [place] [address]  PCP Phone Number: [phone]  PCP Fax: [phone]    Encounter Date: 01/04/2018    Admit to Home Health    Diagnoses:  Active Hospital Problems    Diagnosis  POA    Malignant neoplasm of central portion of left breast in female, estrogen receptor negative [C50.112, Z17.1]  Not Applicable    Pre-op testing [Z01.818]  Not Applicable      Resolved Hospital Problems    Diagnosis Date Resolved POA   No resolved problems to display.       Future Appointments  Date Time Provider Department Center   1/19/2018 1:00 PM [facility]   2/12/2018 8:00 AM Hannah Blair NP [facility]           I have seen and examined this patient face to face today. My clinical findings that support the need for the home health skilled services and home bound status are the following:  Weakness/numbness causing balance and gait disturbance due to Surgery making it taxing to leave home.    Allergies:Review of patient's allergies indicates:  No Known Allergies    Diet: regular diet    Activities: activity as tolerated    Nursing:   SN to complete comprehensive assessment including routine vital signs. Instruct on disease process and s/s of complications to report to MD. Review/verify medication list sent home with the patient at time of discharge  and instruct patient/caregiver as needed. Frequency may be adjusted depending on start of care date.    Notify MD if SBP > 160 or < 90; DBP > 90 or < 50; HR > 120 or < 50; Temp > 101; Other:   Other medical concerns.      CONSULTS:    Aide to provide assistance with personal care, ADLs, and vital signs.    MISCELLANEOUS CARE:  Surgical drain care: Empty drain daily and keep detailed record of daily drain output.     WOUND CARE  ORDERS  Inspect left chest incision and change dressings as needed.        Medications: Review discharge medications with patient and family and provide education.      Current Discharge Medication List      CONTINUE these medications which have NOT CHANGED    Details   amLODIPine (NORVASC) 5 MG tablet Take 1 tablet (5 mg total) by mouth once daily.  Qty: 90 tablet, Refills: 3    Associated Diagnoses: Essential hypertension      carvedilol (COREG) 12.5 MG tablet Take 1 tablet (12.5 mg total) by mouth 2 (two) times daily with meals.  Qty: 180 tablet, Refills: 3    Associated Diagnoses: Essential hypertension      escitalopram oxalate (LEXAPRO) 10 MG tablet Take 1 tablet (10 mg total) by mouth once daily.  Qty: 90 tablet, Refills: 2    Associated Diagnoses: Late onset Alzheimer's disease with behavioral disturbance      haloperidol (HALDOL) 2 MG tablet Take 1 tablet (2 mg total) by mouth once daily.  Qty: 90 tablet, Refills: 3    Associated Diagnoses: Late onset Alzheimer's disease with behavioral disturbance      levothyroxine (SYNTHROID) 50 MCG tablet Take 1 tablet (50 mcg total) by mouth once daily.  Qty: 90 tablet, Refills: 2    Associated Diagnoses: Acquired hypothyroidism      lisinopril (PRINIVIL,ZESTRIL) 20 MG tablet Take 1 tablet (20 mg total) by mouth once daily.  Qty: 90 tablet, Refills: 2    Associated Diagnoses: Essential hypertension      memantine (NAMENDA) 5 MG Tab Take 1 tablet (5 mg total) by mouth 2 (two) times daily.  Qty: 90 tablet, Refills: 2    Associated Diagnoses: Late onset Alzheimer's disease with behavioral disturbance      metFORMIN (GLUCOPHAGE-XR) 500 MG 24 hr tablet       pantoprazole (PROTONIX) 40 MG tablet Take 1 tablet (40 mg total) by mouth once daily.  Qty: 90 tablet, Refills: 2    Associated Diagnoses: Gastroesophageal reflux disease, esophagitis presence not specified      atorvastatin (LIPITOR) 40 MG tablet Take 1 tablet (40 mg total) by mouth once daily.  Qty: 90 tablet,  Refills: 3    Associated Diagnoses: Hyperlipidemia, unspecified hyperlipidemia type      ergocalciferol (ERGOCALCIFEROL) 50,000 unit Cap Take 1 capsule (50,000 Units total) by mouth every 7 days.  Qty: 4 capsule, Refills: 3    Associated Diagnoses: Low vitamin D level      insulin degludec 100 unit/mL (3 mL) InPn Inject 30 Units into the skin once daily.  Qty: 27 mL, Refills: 1    Associated Diagnoses: Type 2 diabetes mellitus with stage 3 chronic kidney disease, with long-term current use of insulin      multivitamin capsule Take 1 capsule by mouth once daily.      TRESIBA FLEXTOUCH U-200 200 unit/mL (3 mL) InPn              I certify that this patient is confined to her home and needs intermittent skilled nursing care.

## 2018-01-04 NOTE — ANESTHESIA PROCEDURE NOTES
Peripheral    Patient location during procedure: pre-op   Block not for primary anesthetic.  Reason for block: at surgeon's request and post-op pain management   Post-op Pain Location: left breast  Start time: 1/4/2018 10:49 AM  Timeout: 1/4/2018 10:44 AM   End time: 1/4/2018 11:02 AM  Surgery related to: left breast  Staffing  Anesthesiologist: CRISTIAN POTTS  Performed: anesthesiologist   Preanesthetic Checklist  Completed: patient identified, site marked, surgical consent, pre-op evaluation, timeout performed, IV checked, risks and benefits discussed and monitors and equipment checked  Peripheral Block  Patient position: supine  Prep: ChloraPrep  Patient monitoring: heart rate, cardiac monitor, continuous pulse ox, continuous capnometry and frequent blood pressure checks  Block type: pectoral  Laterality: left  Injection technique: single shot  Needle  Needle type: Stimuplex   Needle gauge: 21 G  Needle length: 4 in  Needle localization: anatomical landmarks and ultrasound guidance   -ultrasound image captured on disc.  Assessment  Injection assessment: negative aspiration, negative parasthesia and local visualized surrounding nerve  Paresthesia pain: none  Heart rate change: no  Slow fractionated injection: yes  Medications:  Bolus administered: 40 mL of 0.25 ropivacaine (plus clonidine 50mcg & dexamethasone 1mg per 20ml)  Epinephrine added: 3.75 mcg/mL (1/300,000)  Additional Notes    VSS.  DOS[facility] RN monitoring vitals throughout procedure.  Patient tolerated procedure well.

## 2018-01-05 VITALS
HEART RATE: 73 BPM | TEMPERATURE: 97 F | OXYGEN SATURATION: 93 % | BODY MASS INDEX: 43.82 KG/M2 | DIASTOLIC BLOOD PRESSURE: 61 MMHG | RESPIRATION RATE: 18 BRPM | HEIGHT: 65 IN | SYSTOLIC BLOOD PRESSURE: 124 MMHG | WEIGHT: 263 LBS

## 2018-01-05 LAB
ANION GAP SERPL CALC-SCNC: 9 MMOL/L
BASOPHILS # BLD AUTO: 0.04 K/UL
BASOPHILS NFR BLD: 0.3 %
BUN SERPL-MCNC: 19 MG/DL
CALCIUM SERPL-MCNC: 8.8 MG/DL
CHLORIDE SERPL-SCNC: 100 MMOL/L
CO2 SERPL-SCNC: 27 MMOL/L
CREAT SERPL-MCNC: 1.1 MG/DL
DIFFERENTIAL METHOD: ABNORMAL
EOSINOPHIL # BLD AUTO: 0 K/UL
EOSINOPHIL NFR BLD: 0.2 %
ERYTHROCYTE [DISTWIDTH] IN BLOOD BY AUTOMATED COUNT: 16.2 %
EST. GFR  (AFRICAN AMERICAN): 56 ML/MIN/1.73 M^2
EST. GFR  (NON AFRICAN AMERICAN): 49 ML/MIN/1.73 M^2
GLUCOSE SERPL-MCNC: 184 MG/DL
HCT VFR BLD AUTO: 27.3 %
HGB BLD-MCNC: 8.7 G/DL
LYMPHOCYTES # BLD AUTO: 1.7 K/UL
LYMPHOCYTES NFR BLD: 11.7 %
MAGNESIUM SERPL-MCNC: 1.1 MG/DL
MCH RBC QN AUTO: 26.8 PG
MCHC RBC AUTO-ENTMCNC: 31.9 G/DL
MCV RBC AUTO: 84 FL
MONOCYTES # BLD AUTO: 0.8 K/UL
MONOCYTES NFR BLD: 5.6 %
NEUTROPHILS # BLD AUTO: 12 K/UL
NEUTROPHILS NFR BLD: 82.2 %
PHOSPHATE SERPL-MCNC: 3.2 MG/DL
PLATELET # BLD AUTO: 335 K/UL
PMV BLD AUTO: 10.1 FL
POCT GLUCOSE: 182 MG/DL (ref 70–110)
POCT GLUCOSE: 219 MG/DL (ref 70–110)
POCT GLUCOSE: 232 MG/DL (ref 70–110)
POTASSIUM SERPL-SCNC: 4.4 MMOL/L
RBC # BLD AUTO: 3.25 M/UL
SODIUM SERPL-SCNC: 136 MMOL/L
WBC # BLD AUTO: 14.66 K/UL

## 2018-01-05 PROCEDURE — 63600175 PHARM REV CODE 636 W HCPCS: Performed by: STUDENT IN AN ORGANIZED HEALTH CARE EDUCATION/TRAINING PROGRAM

## 2018-01-05 PROCEDURE — 25000003 PHARM REV CODE 250: Performed by: SURGERY

## 2018-01-05 PROCEDURE — 83735 ASSAY OF MAGNESIUM: CPT

## 2018-01-05 PROCEDURE — 85025 COMPLETE CBC W/AUTO DIFF WBC: CPT

## 2018-01-05 PROCEDURE — 80048 BASIC METABOLIC PNL TOTAL CA: CPT

## 2018-01-05 PROCEDURE — 94761 N-INVAS EAR/PLS OXIMETRY MLT: CPT

## 2018-01-05 PROCEDURE — 84100 ASSAY OF PHOSPHORUS: CPT

## 2018-01-05 PROCEDURE — 36415 COLL VENOUS BLD VENIPUNCTURE: CPT

## 2018-01-05 PROCEDURE — 25000003 PHARM REV CODE 250: Performed by: STUDENT IN AN ORGANIZED HEALTH CARE EDUCATION/TRAINING PROGRAM

## 2018-01-05 RX ORDER — POLYETHYLENE GLYCOL 3350 17 G/17G
17 POWDER, FOR SOLUTION ORAL DAILY
Qty: 238 G | Refills: 0 | Status: SHIPPED | OUTPATIENT
Start: 2018-01-05 | End: 2018-01-19 | Stop reason: SDUPTHER

## 2018-01-05 RX ORDER — TRAMADOL HYDROCHLORIDE 50 MG/1
50-100 TABLET ORAL
Qty: 31 TABLET | Refills: 0 | Status: SHIPPED | OUTPATIENT
Start: 2018-01-05 | End: 2018-01-05

## 2018-01-05 RX ORDER — TRAMADOL HYDROCHLORIDE 50 MG/1
50-100 TABLET ORAL EVERY 4 HOURS PRN
Qty: 31 TABLET | Refills: 0 | Status: SHIPPED | OUTPATIENT
Start: 2018-01-05 | End: 2018-05-30

## 2018-01-05 RX ORDER — DOCUSATE SODIUM 100 MG/1
100 CAPSULE, LIQUID FILLED ORAL 2 TIMES DAILY
Refills: 0 | COMMUNITY
Start: 2018-01-05 | End: 2018-01-19 | Stop reason: SDUPTHER

## 2018-01-05 RX ADMIN — SODIUM CHLORIDE, SODIUM LACTATE, POTASSIUM CHLORIDE, AND CALCIUM CHLORIDE: 600; 310; 30; 20 INJECTION, SOLUTION INTRAVENOUS at 08:01

## 2018-01-05 RX ADMIN — HALOPERIDOL 2 MG: 1 TABLET ORAL at 08:01

## 2018-01-05 RX ADMIN — INSULIN ASPART 4 UNITS: 100 INJECTION, SOLUTION INTRAVENOUS; SUBCUTANEOUS at 05:01

## 2018-01-05 RX ADMIN — OXYCODONE AND ACETAMINOPHEN 1 TABLET: 5; 325 TABLET ORAL at 06:01

## 2018-01-05 RX ADMIN — ATORVASTATIN CALCIUM 40 MG: 40 TABLET, FILM COATED ORAL at 08:01

## 2018-01-05 RX ADMIN — PANTOPRAZOLE SODIUM 40 MG: 40 TABLET, DELAYED RELEASE ORAL at 08:01

## 2018-01-05 RX ADMIN — SODIUM CHLORIDE, SODIUM LACTATE, POTASSIUM CHLORIDE, AND CALCIUM CHLORIDE: 600; 310; 30; 20 INJECTION, SOLUTION INTRAVENOUS at 01:01

## 2018-01-05 RX ADMIN — ESCITALOPRAM 10 MG: 10 TABLET, FILM COATED ORAL at 08:01

## 2018-01-05 RX ADMIN — ENOXAPARIN SODIUM 40 MG: 100 INJECTION SUBCUTANEOUS at 04:01

## 2018-01-05 RX ADMIN — CARVEDILOL 12.5 MG: 12.5 TABLET, FILM COATED ORAL at 04:01

## 2018-01-05 RX ADMIN — MEMANTINE HYDROCHLORIDE 5 MG: 5 TABLET ORAL at 08:01

## 2018-01-05 RX ADMIN — LEVOTHYROXINE SODIUM 50 MCG: 50 TABLET ORAL at 05:01

## 2018-01-05 NOTE — DISCHARGE SUMMARY
[facility]  General Surgery  Discharge Summary      Patient Name: Cece Buchanan  MRN: [id number]  Admission Date: 2018  Hospital Length of Stay: 1 days  Discharge Date and Time:  2018 1:40 PM  Attending Physician: Aleisha Solares MD   Discharging Provider: Braulio Yeung MD  Primary Care Provider: Andrew Garcia DO     HPI:    Patient referred by primary care physician for evaluation of left breast mass, fungating.  A mammogram obtained in  was abnormal, demonstrating indeterminate calcifications with grouped distribution in the posterior upper outer region of the right breast.  There is oval mass at the skin margins in the anterior upper outer region of the left breast.  A biopsy was obtained however she moved to [place] where she resumed care.  Since  this mass in her left breast has been growing in size.  It is being maintained by home health nurse Natasha prior to this refused any surgery.  She was told that chemotherapy was not an option given her comorbidities.  According to her daughter a PET scan was obtained in 2017, which did not show metastatic disease.     Menarche occurred at age 13.  Surgical menopause occurred at age 29.  She had a MAY/BSO.  No history of hormone replacement.  She is  with age first pregnancy 20.  No tobacco use history.  No family history of cancer.  No personal history of breast cancer or breast biopsy.    Patient status post left toilet mastectomy on 18.    Procedure(s) (LRB):  MASTECTOMY-SIMPLE (Left)     Hospital Course: Patient presented for left breast mastectomy on 18. She tolerated the procedure well, without complication. She was admitted postoperatively and had an uneventful hospital course. Her pain was well-controlled with oral analgesics, she tolerated a regular diet, and her drain output remained appropriate. She was deemed safe for discharge home on 18.     Consults: SW/CM - Home health drain and wound care.      Significant Diagnostic Studies: Please see full medical record.       Pending Diagnostic Studies:     None        Final Active Diagnoses:    Diagnosis Date Noted POA    PRINCIPAL PROBLEM:  Malignant neoplasm of central portion of left breast in female, estrogen receptor negative [C50.112, Z17.1] 01/04/2018 Not Applicable    Pre-op testing [Z01.818] 01/04/2018 Not Applicable      Problems Resolved During this Admission:    Diagnosis Date Noted Date Resolved POA      Discharged Condition: good    Disposition: Home or Self Care    Follow Up:  Follow-up Information     Aleisha Solares MD In 2 weeks.    Specialties:  Surgery, General Surgery  Why:  For postoperative follow up.  Contact information:  [address]  [address]  [place] [address]  [phone]                 Patient Instructions:     Activity as tolerated     Notify your health care provider if you experience any of the following:  temperature >100.4     Notify your health care provider if you experience any of the following:  persistent nausea and vomiting or diarrhea     Notify your health care provider if you experience any of the following:  severe uncontrolled pain     Notify your health care provider if you experience any of the following:  redness, tenderness, or signs of infection (pain, swelling, redness, odor or green/yellow discharge around incision site)     Notify your health care provider if you experience any of the following:  difficulty breathing or increased cough     Notify your health care provider if you experience any of the following:  severe persistent headache     Notify your health care provider if you experience any of the following:  worsening rash     Notify your health care provider if you experience any of the following:  persistent dizziness, light-headedness, or visual disturbances     Notify your health care provider if you experience any of the following:  increased confusion or weakness     Remove dressing in 24  hours   Scheduling Instructions: May remove outer dressing in 24 hours. May shower in 24 hours with soap and water. Do not soak incision in standing water/tub/pool. Staples will be removed at your clinic visit.       Medications:  Reconciled Home Medications:   Current Discharge Medication List      START taking these medications    Details   docusate sodium (COLACE) 100 MG capsule Take 1 capsule (100 mg total) by mouth 2 (two) times daily.  Refills: 0      polyethylene glycol (GLYCOLAX) 17 gram/dose powder Take 17 g by mouth once daily.  Qty: 238 g, Refills: 0      traMADol (ULTRAM) 50 mg tablet Take 1-2 tablets ( mg total) by mouth every 4 to 6 hours as needed for Pain.  Qty: 31 tablet, Refills: 0         CONTINUE these medications which have NOT CHANGED    Details   amLODIPine (NORVASC) 5 MG tablet Take 1 tablet (5 mg total) by mouth once daily.  Qty: 90 tablet, Refills: 3    Associated Diagnoses: Essential hypertension      carvedilol (COREG) 12.5 MG tablet Take 1 tablet (12.5 mg total) by mouth 2 (two) times daily with meals.  Qty: 180 tablet, Refills: 3    Associated Diagnoses: Essential hypertension      escitalopram oxalate (LEXAPRO) 10 MG tablet Take 1 tablet (10 mg total) by mouth once daily.  Qty: 90 tablet, Refills: 2    Associated Diagnoses: Late onset Alzheimer's disease with behavioral disturbance      haloperidol (HALDOL) 2 MG tablet Take 1 tablet (2 mg total) by mouth once daily.  Qty: 90 tablet, Refills: 3    Associated Diagnoses: Late onset Alzheimer's disease with behavioral disturbance      levothyroxine (SYNTHROID) 50 MCG tablet Take 1 tablet (50 mcg total) by mouth once daily.  Qty: 90 tablet, Refills: 2    Associated Diagnoses: Acquired hypothyroidism      lisinopril (PRINIVIL,ZESTRIL) 20 MG tablet Take 1 tablet (20 mg total) by mouth once daily.  Qty: 90 tablet, Refills: 2    Associated Diagnoses: Essential hypertension      memantine (NAMENDA) 5 MG Tab Take 1 tablet (5 mg total) by  mouth 2 (two) times daily.  Qty: 90 tablet, Refills: 2    Associated Diagnoses: Late onset Alzheimer's disease with behavioral disturbance      metFORMIN (GLUCOPHAGE-XR) 500 MG 24 hr tablet       pantoprazole (PROTONIX) 40 MG tablet Take 1 tablet (40 mg total) by mouth once daily.  Qty: 90 tablet, Refills: 2    Associated Diagnoses: Gastroesophageal reflux disease, esophagitis presence not specified      atorvastatin (LIPITOR) 40 MG tablet Take 1 tablet (40 mg total) by mouth once daily.  Qty: 90 tablet, Refills: 3    Associated Diagnoses: Hyperlipidemia, unspecified hyperlipidemia type      ergocalciferol (ERGOCALCIFEROL) 50,000 unit Cap Take 1 capsule (50,000 Units total) by mouth every 7 days.  Qty: 4 capsule, Refills: 3    Associated Diagnoses: Low vitamin D level      insulin degludec 100 unit/mL (3 mL) InPn Inject 30 Units into the skin once daily.  Qty: 27 mL, Refills: 1    Associated Diagnoses: Type 2 diabetes mellitus with stage 3 chronic kidney disease, with long-term current use of insulin      multivitamin capsule Take 1 capsule by mouth once daily.      TRESIBA FLEXTOUCH U-200 200 unit/mL (3 mL) InPn              Braulio Yeung MD  General Surgery  [facility]

## 2018-01-05 NOTE — NURSING
CASE MANAGEMENT NOTE  SPOKE TO PATIENT'S DAUGHTER'S KEVIN MILLER -[phone] AND EXPLAIN THE DISCHARGE PROCESS TO HER AND ADDRESSED HER CONCERNS. INSTRUCTED HER THAT HER MOTHER HAD A OUTPATIENT PROCEDURE DONE ON 1-4-18 AND WE OBSERVED HER OVERNIGHT AND SHE REMAINS IN OUTPATIENT STATUS AND PER MEDICARE GUIDELINES SHE WOULD NOT BE ABLE TO GO TO A SKILL FACILITY.    WE HAVE SET UP HOME HEALTH WHO CAN ASSIT, IF THEY WANT FULL NURSING HOME PLACEMENT THIS CAN BE DONE  FROM HOME. SHE VERBALLY UNDERSTOOD AND STATES SHE WOULD CALL THE NURSE WHEN SHE GETS HOME.

## 2018-01-05 NOTE — PLAN OF CARE
Problem: Patient Care Overview  Goal: Plan of Care Review  Outcome: Ongoing (interventions implemented as appropriate)  Pt AAOx4, no family members at bedside throughout shift. Pt denies pain, n/v/d, and SOB. Diabetic diet tolerated well. IVF infusing to PIV per MAR. DAILY drain intact and draining moderate amount of sanguinous fluid. Incision to left breast dressing CDI. Blood glucose checks continued. Safety maintained, bed alarm on - will cont to monitor.

## 2018-01-05 NOTE — PLAN OF CARE
TN met with pt   states daughter Sherly Garcia   [phone] knows the name of her home health agency  pt has a bsc, rw, claw cane and wc      pt for d/c to home today   TN called and spoke with Ms. Dubois -- she states she is unable to pick pt up today and wants her to stay in the hospital until the am.    Explained to Ms. Dubois that pt is stable and ready for discharge and there is no medical reason for pt to remain in hospital.  TN offered to have WC van transport pt to home where Ms. Dubois can meet her.      Ms. Dubois repeatedly stated that she wouldn't know when she be at home and didn't want TN to set up WC transport.      TN will call pt in an hour to find out when daughter will be at home to meet pt.       pt will resume HH at discharge  with [facility].       Future Appointments  Date Time Provider Department Center   1/11/2018 1:20 PM DO NOAM Ballesteros [facility]   1/19/2018 1:00 PM Aleisha Solares MD [facility]   2/12/2018 8:00 AM Hannah Blair NP [facility]            01/05/18 1520   Discharge Assessment   Assessment Type Discharge Planning Assessment   Confirmed/corrected address and phone number on facesheet? Yes   Assessment information obtained from? Caregiver;Medical Record   Expected Length of Stay (days) 3   Communicated expected length of stay with patient/caregiver yes   Prior to hospitilization cognitive status: Alert/Oriented   Prior to hospitalization functional status: Assistive Equipment   Current cognitive status: Alert/Oriented   Current Functional Status: Assistive Equipment   Lives With grandchild(srini)   Able to Return to Prior Arrangements yes   Is patient able to care for self after discharge? Yes   Patient's perception of discharge disposition home or selfcare;home health   Readmission Within The Last 30 Days no previous admission in last 30 days   Patient currently being followed by outpatient case management? No    Patient currently receives any other outside agency services? No   Equipment Currently Used at Home bedside commode;walker, rolling;cane, quad;wheelchair   Do you have any problems affording any of your prescribed medications? No   Is the patient taking medications as prescribed? yes   Does the patient have transportation home? (see above note )   Does the patient receive services at the [facility]? No   Discharge Plan A Home;Home with family;Home Health   Patient/Family In Agreement With Plan yes

## 2018-01-05 NOTE — DISCHARGE INSTRUCTIONS
"Wound Care: May remove outer dressing in 24 hours. May shower in 24 hours with soap and water. Do not soak incision in standing water/tub/pool. Staples will be removed at your clinic visit.     Drain Care: Empty drain daily and record daily output. Please bring this record to your clinic visit. Please keep drain "charged" at all times, so that it remains to suction.   "

## 2018-01-05 NOTE — OP NOTE
DATE OF PROCEDURE: 1/4/2018        PREOPERATIVE DIAGNOSIS: Left breast cancer          POSTOPERATIVE DIAGNOSIS: Left breast cancer          PROCEDURE: Left breast toilet mastectomy         SURGEON: Aleisha Solares M.D.    ASSISTANT: Braulio Yeung M.D.      ANESTHESIA: General endotracheal.      PREP: Chlorhexidine.     Specimen  1 Left breast mastectomy     ESTIMATED BLOOD LOSS: 50 mL.      INDICATIONS: The patient is a 77 y.o. year-old female with left breast cancer of the left side that is locally advanced and causing a large, necrosing, non-healing wound. She is here today for a Mastectomy to remove this fungating, non-healing mass. The risks of surgery were discussed with the patient including bleeding, infection, pain, scarring, wound complications, injury to local structures, breast asymmetry and potential need for further surgery. The patient demonstrated understanding of these risks and consent form was obtained.      PROCEDURE IN DETAIL: The patient was identified in the Preoperative Unit and taken back to the Operating Room, laid supine on the operating table. IV antibiotics were administered prior to the induction of general anesthesia. General anesthesia was induced without complication. She was prepped and draped in a standard sterile fashion. A timeout procedure was performed in accordance with hospital protocol. Elliptical incisions were made to incorporate excess skin and nipple-areolar complex. Breast flaps were made towards the clavicle superiorly, the lateral border of the sternum medially, the superior aspect of the rectus at the inframammary fold and the anterior border of the latissimus in the lateral location. This was done carefully to maintain blood supply to the flaps. The breast tissue was then lifted off the pectoralis muscle and included the pectoralis fascia. As we approached the axillary tail, a large, suspicious lymph node was encountered, this was excised circumferentially and sent to  Pathology. The wound bed was then irrigated and hemostasis was achieved.  the flaps were viable. The incision was closed in 2 layers with interrupted 3 vicryl in the deep dermis and skin staples. Dry sterile dressing was applied. At the end of the procedure, the sponge, instrument and needle counts were correct and Dr. Solares was present and scrubbed throughout the entirety of the case. The Patient was awakened from anesthesia without complication and returned to recovery room in stable condition.       COMPLICATIONS: None.      CONDITION: Stable.

## 2018-01-06 NOTE — PROGRESS NOTES
Patient is being discharged home per MD.  Discharge instructions on medications, signs and symptoms when to call the MD, and follow-up visits are given to the patient.  Patient verbalized understanding on the above discharge instructions.  Instructed patient to  medications at [facility].  Verbalized understanding.  Awaiting wheelchair van for .  Talked to daughter Sherly earlier re mother is being discharged.  Instructed patient on emptying and recharging DAILY Drain.  Verbalized understanding.  Will report to oncoming nurse.

## 2018-01-06 NOTE — PROGRESS NOTES
TN spoke with pt's daughter Sherly Garcia  [phone] - she is at home now and ready to receive pt     TN able to arrange transportation with [employer]  p # [phone]   for est 7:30 pm  [place].      Ms. Garcia is aware that pt will be delivered to home per  van this pm.  Delayed arrival due to delay in booking transportation.

## 2018-01-12 ENCOUNTER — TELEPHONE (OUTPATIENT)
Dept: INTERNAL MEDICINE | Facility: CLINIC | Age: 78
End: 2018-01-12

## 2018-01-12 ENCOUNTER — TELEPHONE (OUTPATIENT)
Dept: SURGERY | Facility: CLINIC | Age: 78
End: 2018-01-12

## 2018-01-12 NOTE — TELEPHONE ENCOUNTER
----- Message from Marixa Wild sent at 1/12/2018  2:50 PM CST -----  Contact: [phone] Sherly suerachana  Patient daughter requested to speak with the nurse and advised the patient may have slept on the tube because she is having drainage. Patient daughter would also  like to speak with the nurse about the patient having a home health nurse. Please call and advise.    1/12/18             [id number]  Contacted patient's daughter states blood is coming from the incision after patient was laying down, and turned toward the side where the tube is located, and possibly pulled it. Patient c/o irritation causing her difficulty raising her arm after incident. Some cloudy drainage in the tube. Small amount of blood noted upon last dressing change. Family spoke to home health agency, and was instructed to contact the office for authorization to send a nurse to assess the incision site. Some puffiness at the incision site without redness per family. Patient's daughter informed that I would send a message to Dr. Solares,and get back in touch. Patient's daughter verbalized understanding.

## 2018-01-15 ENCOUNTER — TELEPHONE (OUTPATIENT)
Dept: FAMILY MEDICINE | Facility: CLINIC | Age: 78
End: 2018-01-15

## 2018-01-15 NOTE — TELEPHONE ENCOUNTER
Spoke with patient daughter and Informed her that  approved. Patient for PRN visit.Patient daughter voice understanding.

## 2018-01-15 NOTE — TELEPHONE ENCOUNTER
Spoke with patient daughter patient daughter states that her mother have drainage coming from tube and she's not sure what to do with it. Patient daughter is requesting a home health nurse to come out.

## 2018-01-15 NOTE — PHYSICIAN QUERY
PT Name: Cece Buchanan  MR #: [id number]     Physician Query Form - Documentation Clarification      CDS/: Antonietta Bolden               Contact information: [email or web address]  This form is a permanent document in the medical record.     Query Date: January 15, 2018    By submitting this query, we are merely seeking further clarification of documentation. Please utilize your independent clinical judgment when addressing the question(s) below.    The Medical record reflects the following:    Supporting Clinical Findings Location in Medical Record   Endocrine:   Diabetes, type 2, using insulin Hypothyroidism  Diabetes, Type 2 Diabetes , controlled by insulin. , most recent HgA1c value was 10.2 on 11/28/2017.    Anesthesia Record    Diabetes mellitus type I        History and Physical - Past Medical History                                                                            Dear Doctor, It is documented in the clinical findings above of Diabetes Mellitus Type 1 and 2. Please clarify if Diabetes Mellitus is:    Provider Use Only      ___ Diabetes Mellitus, Type 1  __xxx_ Diabetes Mellitus, Type 2  ___ Other ________________  ___ Undetermined                                                                                                                         [  ] Clinically undetermined

## 2018-01-15 NOTE — TELEPHONE ENCOUNTER
----- Message from Jess Peters sent at 1/12/2018  9:54 AM CST -----  Contact: Elisa Calling from [facility] [phone]  Calling to talk to nurse concerning to see if the doctor can send order for a PRN visit. Please advice

## 2018-01-18 ENCOUNTER — TELEPHONE (OUTPATIENT)
Dept: SURGERY | Facility: CLINIC | Age: 78
End: 2018-01-18

## 2018-01-18 RX ORDER — ATORVASTATIN CALCIUM 20 MG/1
TABLET, FILM COATED ORAL
COMMUNITY
Start: 2017-12-22 | End: 2018-01-19 | Stop reason: SDUPTHER

## 2018-01-18 NOTE — TELEPHONE ENCOUNTER
1/18/18                             2737  Attempted to contact patient regarding scheduling clinic visit. No answer. Left voicemail to return call to the office. Will attempt to contact again at a later time.

## 2018-01-18 NOTE — TELEPHONE ENCOUNTER
----- Message from Kymberly Adam sent at 1/18/2018 12:38 PM CST -----  Contact: [phone]'s daughter  Amnn  Patient's daughter requesting to speak with you regarding pt's incision leaking blood. Please advise.    1/18/18                    1331  Spoke to the patient's daughter who states patient has blood coming from the incision, and that it looks like stitches have cut the drain tube. Patient's daughter, Sherly, informed that I would inform Dr. Solares, and give them a call back. Sherly verbalized understanding.

## 2018-01-19 ENCOUNTER — OUTPATIENT CASE MANAGEMENT (OUTPATIENT)
Dept: ADMINISTRATIVE | Facility: OTHER | Age: 78
End: 2018-01-19

## 2018-01-19 ENCOUNTER — TELEPHONE (OUTPATIENT)
Dept: FAMILY MEDICINE | Facility: CLINIC | Age: 78
End: 2018-01-19

## 2018-01-19 ENCOUNTER — OFFICE VISIT (OUTPATIENT)
Dept: FAMILY MEDICINE | Facility: CLINIC | Age: 78
End: 2018-01-19
Payer: MEDICARE

## 2018-01-19 ENCOUNTER — OFFICE VISIT (OUTPATIENT)
Dept: SURGERY | Facility: CLINIC | Age: 78
End: 2018-01-19
Payer: MEDICARE

## 2018-01-19 VITALS — HEART RATE: 68 BPM | SYSTOLIC BLOOD PRESSURE: 169 MMHG | DIASTOLIC BLOOD PRESSURE: 78 MMHG

## 2018-01-19 VITALS
OXYGEN SATURATION: 97 % | HEIGHT: 65 IN | TEMPERATURE: 98 F | WEIGHT: 262.56 LBS | DIASTOLIC BLOOD PRESSURE: 64 MMHG | BODY MASS INDEX: 43.75 KG/M2 | SYSTOLIC BLOOD PRESSURE: 124 MMHG | HEART RATE: 63 BPM

## 2018-01-19 DIAGNOSIS — G30.1 LATE ONSET ALZHEIMER'S DISEASE WITH BEHAVIORAL DISTURBANCE: ICD-10-CM

## 2018-01-19 DIAGNOSIS — Z79.4 TYPE 2 DIABETES MELLITUS WITH STAGE 3 CHRONIC KIDNEY DISEASE, WITH LONG-TERM CURRENT USE OF INSULIN: ICD-10-CM

## 2018-01-19 DIAGNOSIS — Z90.12 STATUS POST MASTECTOMY, LEFT: ICD-10-CM

## 2018-01-19 DIAGNOSIS — C50.112 MALIGNANT NEOPLASM OF CENTRAL PORTION OF LEFT BREAST IN FEMALE, ESTROGEN RECEPTOR NEGATIVE: Primary | ICD-10-CM

## 2018-01-19 DIAGNOSIS — E78.5 HYPERLIPIDEMIA, UNSPECIFIED HYPERLIPIDEMIA TYPE: ICD-10-CM

## 2018-01-19 DIAGNOSIS — F02.818 LATE ONSET ALZHEIMER'S DISEASE WITH BEHAVIORAL DISTURBANCE: ICD-10-CM

## 2018-01-19 DIAGNOSIS — K59.00 CONSTIPATION, UNSPECIFIED CONSTIPATION TYPE: ICD-10-CM

## 2018-01-19 DIAGNOSIS — I10 ESSENTIAL HYPERTENSION: ICD-10-CM

## 2018-01-19 DIAGNOSIS — N18.30 TYPE 2 DIABETES MELLITUS WITH STAGE 3 CHRONIC KIDNEY DISEASE, WITH LONG-TERM CURRENT USE OF INSULIN: ICD-10-CM

## 2018-01-19 DIAGNOSIS — E11.22 TYPE 2 DIABETES MELLITUS WITH STAGE 3 CHRONIC KIDNEY DISEASE, WITH LONG-TERM CURRENT USE OF INSULIN: ICD-10-CM

## 2018-01-19 DIAGNOSIS — Z17.1 MALIGNANT NEOPLASM OF CENTRAL PORTION OF LEFT BREAST IN FEMALE, ESTROGEN RECEPTOR NEGATIVE: Primary | ICD-10-CM

## 2018-01-19 DIAGNOSIS — Z99.3 WHEELCHAIR BOUND: ICD-10-CM

## 2018-01-19 DIAGNOSIS — Z17.1 MALIGNANT NEOPLASM OF UPPER-OUTER QUADRANT OF LEFT BREAST IN FEMALE, ESTROGEN RECEPTOR NEGATIVE: Primary | ICD-10-CM

## 2018-01-19 DIAGNOSIS — C50.412 MALIGNANT NEOPLASM OF UPPER-OUTER QUADRANT OF LEFT BREAST IN FEMALE, ESTROGEN RECEPTOR NEGATIVE: Primary | ICD-10-CM

## 2018-01-19 DIAGNOSIS — R53.81 DEBILITY: ICD-10-CM

## 2018-01-19 PROBLEM — Z01.818 PRE-OP TESTING: Status: RESOLVED | Noted: 2018-01-04 | Resolved: 2018-01-19

## 2018-01-19 PROCEDURE — 99213 OFFICE O/P EST LOW 20 MIN: CPT | Mod: PBBFAC,27,PO | Performed by: SURGERY

## 2018-01-19 PROCEDURE — 99999 PR PBB SHADOW E&M-EST. PATIENT-LVL III: CPT | Mod: PBBFAC,,, | Performed by: SURGERY

## 2018-01-19 PROCEDURE — 99215 OFFICE O/P EST HI 40 MIN: CPT | Mod: S$GLB,,, | Performed by: FAMILY MEDICINE

## 2018-01-19 PROCEDURE — 99999 PR PBB SHADOW E&M-EST. PATIENT-LVL IV: CPT | Mod: PBBFAC,,, | Performed by: FAMILY MEDICINE

## 2018-01-19 PROCEDURE — 99024 POSTOP FOLLOW-UP VISIT: CPT | Mod: POP,,, | Performed by: SURGERY

## 2018-01-19 PROCEDURE — 99214 OFFICE O/P EST MOD 30 MIN: CPT | Mod: PBBFAC,PO | Performed by: FAMILY MEDICINE

## 2018-01-19 RX ORDER — HALOPERIDOL 2 MG/1
2 TABLET ORAL DAILY
Qty: 90 TABLET | Refills: 3 | Status: SHIPPED | OUTPATIENT
Start: 2018-01-19 | End: 2018-04-17 | Stop reason: SDUPTHER

## 2018-01-19 RX ORDER — ATORVASTATIN CALCIUM 20 MG/1
20 TABLET, FILM COATED ORAL DAILY
Qty: 90 TABLET | Refills: 1 | Status: SHIPPED | OUTPATIENT
Start: 2018-01-19 | End: 2018-04-24 | Stop reason: SDUPTHER

## 2018-01-19 RX ORDER — INSULIN DEGLUDEC 200 U/ML
24 INJECTION, SOLUTION SUBCUTANEOUS NIGHTLY
Qty: 12 ML | Refills: 0 | Status: SHIPPED | OUTPATIENT
Start: 2018-01-19 | End: 2018-02-01 | Stop reason: SDUPTHER

## 2018-01-19 RX ORDER — DOCUSATE SODIUM 100 MG/1
100 CAPSULE, LIQUID FILLED ORAL 2 TIMES DAILY
Qty: 60 CAPSULE | Refills: 0 | Status: SHIPPED | OUTPATIENT
Start: 2018-01-19 | End: 2018-05-30

## 2018-01-19 RX ORDER — POLYETHYLENE GLYCOL 3350 17 G/17G
17 POWDER, FOR SOLUTION ORAL DAILY
Qty: 238 G | Refills: 0 | Status: SHIPPED | OUTPATIENT
Start: 2018-01-19 | End: 2018-02-02

## 2018-01-19 NOTE — PROGRESS NOTES
Subjective:       Patient ID: Cece Buchanan is a 77 y.o. female.    Chief Complaint: Follow-up    Cece is a 77 y.o. female who presents today with follow up from recent hospitalization for mastectomy. She is seeing general surgeon and had an appointment this morning along with a follow up in one week.     Dm: Her sugars have been around 130's with the tresiba. No symptoms of low blood sugar. They are only taking the tresiba at 24 U/day. They are only taking metformin intermittently. They did not bring in a blood sugar log today. They deny any symptoms of hypoglycemia.     HTN: controlled on medication. No light headedness or dizziness.     DLD: on atorvastatin 20 mg, tolerating it well.     Neuro: saw neurology. They stated: She is on namenda and is declining further neurological testing    Constipation: is not taking any of the prescribed medication    Daughter is here with patient and desires a bedside rail. They state that there is no current home health.       Review of Systems   Constitutional: Positive for chills. Negative for fever.   Respiratory: Negative for shortness of breath.    Cardiovascular: Negative for chest pain.   Gastrointestinal: Positive for constipation. Negative for diarrhea, nausea and vomiting.   Genitourinary: Negative for difficulty urinating.   Skin: Positive for wound.         Objective:     Vitals:    01/19/18 1019   BP: 124/64   Pulse: 63   Temp: 97.7 °F (36.5 °C)        Physical Exam   Constitutional:   Wheelchair bound  Tremor noted   Cardiovascular: Normal rate and regular rhythm.    Pulmonary/Chest: Effort normal.   Nursing note and vitals reviewed.      Assessment:       1. Malignant neoplasm of upper-outer quadrant of left breast in female, estrogen receptor negative    2. Status post mastectomy, left    3. Debility    4. Wheelchair bound    5. Constipation, unspecified constipation type    6. Late onset Alzheimer's disease with behavioral disturbance    7. Type 2 diabetes  mellitus with stage 3 chronic kidney disease, with long-term current use of insulin    8. Essential hypertension    9. Hyperlipidemia, unspecified hyperlipidemia type        Plan:       Try to get social work/HH/Case management involved in the care of this patient. She was recently in [place] where she had Hh, but is apparently having some issues here. Keep apt with surgery. Bring blood sugar log. Follow up on a1c after next visit.       Malignant neoplasm of upper-outer quadrant of left breast in female, estrogen receptor negative  -     SUBSEQUENT HOME HEALTH ORDERS  -     Ambulatory referral to Social Work  -     Ambulatory referral to Outpatient Case Management    Status post mastectomy, left  -     SUBSEQUENT HOME HEALTH ORDERS  -     Ambulatory referral to Social Work  -     Ambulatory referral to Outpatient Case Management    Debility  -     SUBSEQUENT HOME HEALTH ORDERS  -     Ambulatory referral to Social Work  -     Ambulatory referral to Outpatient Case Management    Wheelchair bound  -     SUBSEQUENT HOME HEALTH ORDERS  -     Ambulatory referral to Social Work  -     Ambulatory referral to Outpatient Case Management    Constipation, unspecified constipation type  Continue regimen as previously ordered  Call if abdominal pain develops    Late onset Alzheimer's disease with behavioral disturbance  Continue haldol as previously prescribed  Patient is refusing workup by neurology  -     haloperidol (HALDOL) 2 MG tablet; Take 1 tablet (2 mg total) by mouth once daily.  Dispense: 90 tablet; Refill: 3    Type 2 diabetes mellitus with stage 3 chronic kidney disease, with long-term current use of insulin  -     TRESIBA FLEXTOUCH U-200 200 unit/mL (3 mL) InPn; Inject 24 Units into the skin every evening.  Dispense: 12 mL; Refill: 0  -     atorvastatin (LIPITOR) 20 MG tablet; Take 1 tablet (20 mg total) by mouth once daily.  Dispense: 90 tablet; Refill: 1    Essential hypertension  Continue  medicine    Hyperlipidemia, unspecified hyperlipidemia type  Continue statin    Other orders  -     polyethylene glycol (GLYCOLAX) 17 gram/dose powder; Take 17 g by mouth once daily.  Dispense: 238 g; Refill: 0  -     docusate sodium (COLACE) 100 MG capsule; Take 1 capsule (100 mg total) by mouth 2 (two) times daily.  Dispense: 60 capsule; Refill: 0        Warning signs discussed, patient to call with any further issues or worsening of symptoms.     40 minutes spent with patient, of which >50% was spent on counseling and coordination of care.

## 2018-01-19 NOTE — LETTER
January 19, 2018      [facility]  [address]  [place] [address]  Phone: [phone]  Fax: [phone]       Patient: Cece Buchanan   YOB: 1940  Date of Visit: 01/19/2018    To Whom It May Concern:     Sherly Altmanen is the caregiver for her mother Ms.Joyce Buchanan   If you have any questions or concerns, or if I can be of further assistance, please do not hesitate to contact me.    Sincerely,    Andrew Garcia D.O

## 2018-01-19 NOTE — PROGRESS NOTES
S:   Patient s/p left toilet mastectomy on 1/4 for large, fungating left breast mass.   Patient c/o continued bloody-appearing drainage from incision.  Drain fell out this AM, but pt reports output had declined to about 20-25 cc/day.  Otherwise, pt feeling well postop - no fever, chills, increased pain, or incisional redness, warmth, or purulence.   Final path reveals metaplastic carcinoma, grade 3. Single suspicious axillary lymph node excised - final path reveals reactive node/no mets.    O:  Vitals:    01/19/18 0824   BP: (!) 169/78   Pulse: 68   Temp: (P) 97.7 °F (36.5 °C)     Left breast - Incision with staples in place, healing well; slightly fluctuant under majority of incision with some pinpoint areas of serosang drainage. No redness, warmth, or purulence    A/P:  - Doing well post op  - Will leave staples in place for now, as fluid is likely to continue to accumulate now that the drain is out  - Instructed pt to wear a supportive bra to minimize edema  - RTC in one week to reevaluate

## 2018-01-19 NOTE — TELEPHONE ENCOUNTER
----- Message from Stephanie Patricio sent at 1/19/2018 12:56 PM CST -----  Patient's daughter, Sherly, called.   No. [phone]    Sherly would like to schedule appointments for the home health nurse.    Please call.

## 2018-01-19 NOTE — PROGRESS NOTES
Thank you for the referral.  Patient has been assigned to JIA Manzano for low risk screening for Outpatient Case Management.     Reason for referral: Low risk    Malignant neoplasm of upper-outer quadrant of left breast in female, estrogen receptor negative  Status post mastectomy, left  Debility  Wheelchair bound    Please contact [facility] at ext. [phone] with any questions.    Thank you,    Brooke Oneill, [facility]  [place]mt  Ext [phone]

## 2018-01-22 ENCOUNTER — TELEPHONE (OUTPATIENT)
Dept: FAMILY MEDICINE | Facility: CLINIC | Age: 78
End: 2018-01-22

## 2018-01-22 ENCOUNTER — OUTPATIENT CASE MANAGEMENT (OUTPATIENT)
Dept: ADMINISTRATIVE | Facility: OTHER | Age: 78
End: 2018-01-22

## 2018-01-22 NOTE — PROGRESS NOTES
This CSW received a referral on the above patient.   Reason for referral:malignant neoplasm of upper outer quadrant  Name of the community resource that was provided:Mediciad Application, Community Choice Waiver Program, Hired Caregivers, [facility] Family Services  Program, Senior Resource Guide, [facility] (Pace Information) [facility]. [facility],   Resource given to: Patient daughter  via US mail and telephone    This CSW completed assessment with patient daughter Sheryl Garcia. Daughter reports patient resides alone. Daughter reports patient requires assistance with ADL's. Daughter reports family assist as well as HH with ADL's. . Daughter reports patient will be discharged from  on tomorrow. Daughter reports patient really needs PT, OT  SN and  Aid. CSW advised daughter HH stays in the  indefinite. CSW reviewed chart and found patient has new orders for  services. CSW will follow up with  agency  CSW encourage daughter to apply for Medicaid as a secondary to assist with personal care services. Daughter reports patient was recently dropped from Medicaid. CSW mailed a Medicaid Application to assist with additional support. Patient ambulates with a wheelchair. Daughter reports patient does not want an Emergency Alert System . Daughter denied patient needs assistance with food and shelter, however needs assistance with medical and medicine. CSW mailed a Financial Assistance Application to assist with outstanding bills. CSW also referred patient to Pharmacy Assistance to assist with medication affordability. Daughter reports her and family assist with patient care. CSW provided [facility]  Services to assist with additional resources. Daughter reports an application was completed for RTA, however still awaiting  decision. Daughter is patient POA. CSW mailed all resources and provided her contact information for any additional needs.       CSW contacted  Marimar [phone] with [facility]. Marimar reports patient will be discharged from services on tomorrow. CSW ask Marimar if new HH orders have been received. Marimar reports no. Marimar found new orders and will give it to the . Marimar contreras HH orders will be updated    2:30 CSW contacted patient Daughter to inform her HH order have been updated and received. CSW encouraged daughter to contact [facility] if she has not heard anything by tomorrow.

## 2018-01-22 NOTE — TELEPHONE ENCOUNTER
Spoke with Alina from [facility] and she state that is no more service that home health can offer Cece.

## 2018-01-22 NOTE — TELEPHONE ENCOUNTER
----- Message from Domingo Luis sent at 1/22/2018  2:37 PM CST -----  Contact: Alina bolanos/[facility] [phone]  Alina is requesting to speak with you concerning the pt orders. Alina states the things you are ordering the pt already has.    Please call and advise

## 2018-01-24 ENCOUNTER — TELEPHONE (OUTPATIENT)
Dept: SURGERY | Facility: CLINIC | Age: 78
End: 2018-01-24

## 2018-01-24 DIAGNOSIS — F02.818 LATE ONSET ALZHEIMER'S DISEASE WITH BEHAVIORAL DISTURBANCE: ICD-10-CM

## 2018-01-24 DIAGNOSIS — G30.1 LATE ONSET ALZHEIMER'S DISEASE WITH BEHAVIORAL DISTURBANCE: ICD-10-CM

## 2018-01-24 RX ORDER — MEMANTINE HYDROCHLORIDE 5 MG/1
5 TABLET ORAL 2 TIMES DAILY
Qty: 90 TABLET | Refills: 2 | Status: SHIPPED | OUTPATIENT
Start: 2018-01-24 | End: 2018-04-17 | Stop reason: SDUPTHER

## 2018-01-24 NOTE — TELEPHONE ENCOUNTER
----- Message from Rylee Zamora sent at 1/24/2018  9:26 AM CST -----  Contact: Alize bolanos/ [facility] [phone]  Nurse requesting to speak with you regarding wound care orders and a follow up appt. Please call and advise.    1/24/18                                            1050  Spoke to the nurse, and informed her that patient is to continue home health. For wound care, cleanse wound with soap and water, and cover with gauze. Replace 2-3 times a day as needed. Patient has a follow up appointment on 01/26/18 at 11:20am. Nurse verbalized understanding, and wanted to notify Dr. Solares of a recent increase in drainage. Dr. Solares notified, and states that is ok. Alize verbalized understanding.

## 2018-01-26 ENCOUNTER — OFFICE VISIT (OUTPATIENT)
Dept: SURGERY | Facility: CLINIC | Age: 78
End: 2018-01-26
Payer: MEDICARE

## 2018-01-26 VITALS
SYSTOLIC BLOOD PRESSURE: 152 MMHG | WEIGHT: 262.56 LBS | HEIGHT: 65 IN | BODY MASS INDEX: 43.75 KG/M2 | HEART RATE: 61 BPM | DIASTOLIC BLOOD PRESSURE: 75 MMHG | TEMPERATURE: 98 F

## 2018-01-26 DIAGNOSIS — C50.112 MALIGNANT NEOPLASM OF CENTRAL PORTION OF LEFT BREAST IN FEMALE, ESTROGEN RECEPTOR NEGATIVE: ICD-10-CM

## 2018-01-26 DIAGNOSIS — T14.8XXA WOUND DISCHARGE: ICD-10-CM

## 2018-01-26 DIAGNOSIS — T14.8XXA WOUND DRAINAGE: Primary | ICD-10-CM

## 2018-01-26 DIAGNOSIS — Z17.1 MALIGNANT NEOPLASM OF CENTRAL PORTION OF LEFT BREAST IN FEMALE, ESTROGEN RECEPTOR NEGATIVE: ICD-10-CM

## 2018-01-26 LAB
GRAM STN SPEC: NORMAL
GRAM STN SPEC: NORMAL

## 2018-01-26 PROCEDURE — 87077 CULTURE AEROBIC IDENTIFY: CPT | Mod: 59

## 2018-01-26 PROCEDURE — 99213 OFFICE O/P EST LOW 20 MIN: CPT | Mod: PBBFAC,PO | Performed by: SURGERY

## 2018-01-26 PROCEDURE — 99999 PR PBB SHADOW E&M-EST. PATIENT-LVL III: CPT | Mod: PBBFAC,,, | Performed by: SURGERY

## 2018-01-26 PROCEDURE — 87186 SC STD MICRODIL/AGAR DIL: CPT | Mod: 59

## 2018-01-26 PROCEDURE — 87070 CULTURE OTHR SPECIMN AEROBIC: CPT

## 2018-01-26 PROCEDURE — 87205 SMEAR GRAM STAIN: CPT

## 2018-01-26 PROCEDURE — 99024 POSTOP FOLLOW-UP VISIT: CPT | Mod: POP,,, | Performed by: SURGERY

## 2018-01-26 PROCEDURE — 87075 CULTR BACTERIA EXCEPT BLOOD: CPT

## 2018-01-29 NOTE — PROGRESS NOTES
S/p mastectomy for hygiene, has been having lots of bloody drainage  Drain fell out early  No pain issues  HH evaluating wound daily    Will continue HH  Cultured wound  Massage to evac fluid today  F/up 1 week

## 2018-01-30 ENCOUNTER — TELEPHONE (OUTPATIENT)
Dept: SURGERY | Facility: CLINIC | Age: 78
End: 2018-01-30

## 2018-01-30 LAB
BACTERIA SPEC AEROBE CULT: NORMAL
BACTERIA SPEC AEROBE CULT: NORMAL

## 2018-01-30 RX ORDER — AMOXICILLIN AND CLAVULANATE POTASSIUM 875; 125 MG/1; MG/1
1 TABLET, FILM COATED ORAL EVERY 12 HOURS
Qty: 28 TABLET | Refills: 0 | Status: SHIPPED | OUTPATIENT
Start: 2018-01-30 | End: 2018-02-13

## 2018-01-30 RX ORDER — DOXYCYCLINE 150 MG/1
150 CAPSULE ORAL 2 TIMES DAILY
Qty: 28 EACH | Refills: 0 | Status: SHIPPED | OUTPATIENT
Start: 2018-01-30 | End: 2018-02-13

## 2018-01-30 NOTE — TELEPHONE ENCOUNTER
MRSA and Proteus growing from wound, Im going to send 2 antibiotics, please let her sister know. ty

## 2018-01-30 NOTE — TELEPHONE ENCOUNTER
----- Message from Rg Li sent at 1/30/2018  3:36 PM CST -----  Contact: sarah/[facility]/[phone]/  Can you change the doxycycline monohydrate 150 mg Cap to 50mg or 100mg; the copay on the 150mg is $200 and $15 on the 50mg or  100mg  2nd call

## 2018-01-30 NOTE — TELEPHONE ENCOUNTER
01/30/2018              1611  Contacted Juli regarding the above message. Informed Juli that per Dr. Solares, it is okay to fill the the doxycycline monohydrate to the 50mg bid. Juli verbalized understanding

## 2018-01-31 LAB — BACTERIA SPEC ANAEROBE CULT: NORMAL

## 2018-02-01 DIAGNOSIS — N18.30 TYPE 2 DIABETES MELLITUS WITH STAGE 3 CHRONIC KIDNEY DISEASE, WITH LONG-TERM CURRENT USE OF INSULIN: ICD-10-CM

## 2018-02-01 DIAGNOSIS — Z79.4 TYPE 2 DIABETES MELLITUS WITH STAGE 3 CHRONIC KIDNEY DISEASE, WITH LONG-TERM CURRENT USE OF INSULIN: ICD-10-CM

## 2018-02-01 DIAGNOSIS — I10 ESSENTIAL HYPERTENSION: ICD-10-CM

## 2018-02-01 DIAGNOSIS — E11.22 TYPE 2 DIABETES MELLITUS WITH STAGE 3 CHRONIC KIDNEY DISEASE, WITH LONG-TERM CURRENT USE OF INSULIN: ICD-10-CM

## 2018-02-01 RX ORDER — INSULIN DEGLUDEC 200 U/ML
24 INJECTION, SOLUTION SUBCUTANEOUS NIGHTLY
Qty: 12 ML | Refills: 0 | Status: SHIPPED | OUTPATIENT
Start: 2018-02-01 | End: 2018-04-25 | Stop reason: SDUPTHER

## 2018-02-01 RX ORDER — AMLODIPINE BESYLATE 5 MG/1
5 TABLET ORAL DAILY
Qty: 90 TABLET | Refills: 3 | Status: SHIPPED | OUTPATIENT
Start: 2018-02-01 | End: 2019-10-30

## 2018-02-02 ENCOUNTER — OFFICE VISIT (OUTPATIENT)
Dept: SURGERY | Facility: CLINIC | Age: 78
End: 2018-02-02
Payer: MEDICARE

## 2018-02-02 VITALS
TEMPERATURE: 98 F | BODY MASS INDEX: 43.75 KG/M2 | DIASTOLIC BLOOD PRESSURE: 88 MMHG | SYSTOLIC BLOOD PRESSURE: 161 MMHG | HEART RATE: 72 BPM | HEIGHT: 65 IN | WEIGHT: 262.56 LBS

## 2018-02-02 DIAGNOSIS — C50.412 MALIGNANT NEOPLASM OF UPPER-OUTER QUADRANT OF LEFT BREAST IN FEMALE, ESTROGEN RECEPTOR NEGATIVE: Primary | ICD-10-CM

## 2018-02-02 DIAGNOSIS — Z17.1 MALIGNANT NEOPLASM OF UPPER-OUTER QUADRANT OF LEFT BREAST IN FEMALE, ESTROGEN RECEPTOR NEGATIVE: Primary | ICD-10-CM

## 2018-02-02 PROCEDURE — 99024 POSTOP FOLLOW-UP VISIT: CPT | Mod: S$GLB,,, | Performed by: SURGERY

## 2018-02-02 PROCEDURE — 99999 PR PBB SHADOW E&M-EST. PATIENT-LVL III: CPT | Mod: PBBFAC,,, | Performed by: SURGERY

## 2018-02-02 NOTE — PROGRESS NOTES
S/p mastectomy for hygiene, drainage improved no fevers  She has started augmentin but not doxy, starting today  Culture with MRSA and Proteus, history of ESBL    Inc with thin drainage minimal, no erythema or tenderness      Will continue HH  F/up 1 week

## 2018-02-06 ENCOUNTER — TELEPHONE (OUTPATIENT)
Dept: SURGERY | Facility: CLINIC | Age: 78
End: 2018-02-06

## 2018-02-06 NOTE — TELEPHONE ENCOUNTER
----- Message from Prince Kamara sent at 2/6/2018  8:19 AM CST -----  Contact: Sherly (daughter)/[phone]  Patient's incision is still draining from procedure done on 01/04/18.  She just wants to know if this is normal.     Please call and advise.    2/6/18               0840  The incision site started bleeding yesterday. I put gauze on it, and there was still a little bit of drainage today. I'll watch it today, and if it's draining more, I'll give you guys a call back. Just wanted to let Dr. Solares know.

## 2018-02-09 ENCOUNTER — OFFICE VISIT (OUTPATIENT)
Dept: SURGERY | Facility: CLINIC | Age: 78
End: 2018-02-09
Payer: MEDICARE

## 2018-02-09 DIAGNOSIS — C50.112 MALIGNANT NEOPLASM OF CENTRAL PORTION OF LEFT BREAST IN FEMALE, ESTROGEN RECEPTOR NEGATIVE: Primary | ICD-10-CM

## 2018-02-09 DIAGNOSIS — Z17.1 MALIGNANT NEOPLASM OF CENTRAL PORTION OF LEFT BREAST IN FEMALE, ESTROGEN RECEPTOR NEGATIVE: Primary | ICD-10-CM

## 2018-02-09 PROCEDURE — 99024 POSTOP FOLLOW-UP VISIT: CPT | Mod: S$GLB,,, | Performed by: SURGERY

## 2018-02-09 PROCEDURE — 99999 PR PBB SHADOW E&M-EST. PATIENT-LVL III: CPT | Mod: PBBFAC,,, | Performed by: SURGERY

## 2018-02-12 ENCOUNTER — TELEPHONE (OUTPATIENT)
Dept: SURGERY | Facility: CLINIC | Age: 78
End: 2018-02-12

## 2018-02-12 NOTE — TELEPHONE ENCOUNTER
----- Message from Angelita Weaver sent at 2/12/2018 11:36 AM CST -----  Contact: [phone]/Carlos Eduardo with [facility]   Mrs Thibodeaux  Called stating she is in the pt's home and she needs wound care order clarifications . Please advise        02/12/18                           1634  Returned Allegra's call regarding patient. Allegra states patient incision is draining a bilious, brown, milky fluid, and that the wound is undermining. Wound measurements: 4.8cm deep, left and right side. Patient needs packing preferably with Iodoform, but Aquacel is fine too. Allegra informed that Dr. Solares was out of the office today, but that I would send her a message regarding request for orders. Allegra verbalized understanding.

## 2018-02-12 NOTE — TELEPHONE ENCOUNTER
02/12/18                  1653  Contacted Allegra with Home Health with order for Aquacel Monday, Wednesday and Friday and prn per Dr. Baxter, clinic on call Provider. Allegra verbalized understanding.

## 2018-02-16 ENCOUNTER — OFFICE VISIT (OUTPATIENT)
Dept: SURGERY | Facility: CLINIC | Age: 78
End: 2018-02-16
Payer: MEDICARE

## 2018-02-16 DIAGNOSIS — Z17.1 MALIGNANT NEOPLASM OF UPPER-OUTER QUADRANT OF LEFT BREAST IN FEMALE, ESTROGEN RECEPTOR NEGATIVE: Primary | ICD-10-CM

## 2018-02-16 DIAGNOSIS — C50.412 MALIGNANT NEOPLASM OF UPPER-OUTER QUADRANT OF LEFT BREAST IN FEMALE, ESTROGEN RECEPTOR POSITIVE: ICD-10-CM

## 2018-02-16 DIAGNOSIS — Z17.0 MALIGNANT NEOPLASM OF UPPER-OUTER QUADRANT OF LEFT BREAST IN FEMALE, ESTROGEN RECEPTOR POSITIVE: ICD-10-CM

## 2018-02-16 DIAGNOSIS — C50.412 MALIGNANT NEOPLASM OF UPPER-OUTER QUADRANT OF LEFT BREAST IN FEMALE, ESTROGEN RECEPTOR NEGATIVE: Primary | ICD-10-CM

## 2018-02-16 PROCEDURE — 99999 PR PBB SHADOW E&M-EST. PATIENT-LVL III: CPT | Mod: PBBFAC,,, | Performed by: SURGERY

## 2018-02-16 PROCEDURE — 99024 POSTOP FOLLOW-UP VISIT: CPT | Mod: S$GLB,,, | Performed by: SURGERY

## 2018-02-16 NOTE — PROGRESS NOTES
S/p mastectomy for hygiene, drainage improved no fevers  On augmentin and doxy  Culture with MRSA and Proteus, history of ESBL    Inc with thin drainage minimal, no erythema or tenderness      Will continue HH  F/up 1 week

## 2018-02-19 PROBLEM — T81.49XA SURGICAL SITE INFECTION: Status: ACTIVE | Noted: 2018-02-19

## 2018-02-19 NOTE — PROGRESS NOTES
S/p mastectomy for hygiene, drainage improved no fevers  Completed antibiotics  Culture with MRSA and Proteus, history of ESBL    Inc with thin drainage minimal, no erythema or tenderness  Superficial dehiscence is larger aprpox 2 cm with tunneling laterally 5 cm and medially 2 cm  Will pack, clarify orders with  HH  F/up 1 week

## 2018-02-21 ENCOUNTER — LAB VISIT (OUTPATIENT)
Dept: LAB | Facility: HOSPITAL | Age: 78
End: 2018-02-21
Attending: FAMILY MEDICINE
Payer: MEDICARE

## 2018-02-21 ENCOUNTER — TELEPHONE (OUTPATIENT)
Dept: FAMILY MEDICINE | Facility: CLINIC | Age: 78
End: 2018-02-21

## 2018-02-21 ENCOUNTER — OUTPATIENT CASE MANAGEMENT (OUTPATIENT)
Dept: ADMINISTRATIVE | Facility: OTHER | Age: 78
End: 2018-02-21

## 2018-02-21 ENCOUNTER — OFFICE VISIT (OUTPATIENT)
Dept: FAMILY MEDICINE | Facility: CLINIC | Age: 78
End: 2018-02-21
Attending: FAMILY MEDICINE
Payer: MEDICARE

## 2018-02-21 VITALS
HEIGHT: 65 IN | OXYGEN SATURATION: 97 % | HEART RATE: 65 BPM | DIASTOLIC BLOOD PRESSURE: 92 MMHG | SYSTOLIC BLOOD PRESSURE: 134 MMHG

## 2018-02-21 DIAGNOSIS — I10 HTN (HYPERTENSION), BENIGN: ICD-10-CM

## 2018-02-21 DIAGNOSIS — N95.9 POSTMENOPAUSAL SYMPTOMS: ICD-10-CM

## 2018-02-21 DIAGNOSIS — Z79.4 TYPE 2 DIABETES MELLITUS WITH STAGE 3 CHRONIC KIDNEY DISEASE, WITH LONG-TERM CURRENT USE OF INSULIN: Primary | ICD-10-CM

## 2018-02-21 DIAGNOSIS — C50.112 MALIGNANT NEOPLASM OF CENTRAL PORTION OF LEFT BREAST IN FEMALE, ESTROGEN RECEPTOR NEGATIVE: ICD-10-CM

## 2018-02-21 DIAGNOSIS — N18.30 TYPE 2 DIABETES MELLITUS WITH STAGE 3 CHRONIC KIDNEY DISEASE, WITH LONG-TERM CURRENT USE OF INSULIN: ICD-10-CM

## 2018-02-21 DIAGNOSIS — N18.30 TYPE 2 DIABETES MELLITUS WITH STAGE 3 CHRONIC KIDNEY DISEASE, WITH LONG-TERM CURRENT USE OF INSULIN: Primary | ICD-10-CM

## 2018-02-21 DIAGNOSIS — Z17.1 MALIGNANT NEOPLASM OF CENTRAL PORTION OF LEFT BREAST IN FEMALE, ESTROGEN RECEPTOR NEGATIVE: ICD-10-CM

## 2018-02-21 DIAGNOSIS — E11.22 TYPE 2 DIABETES MELLITUS WITH STAGE 3 CHRONIC KIDNEY DISEASE, WITH LONG-TERM CURRENT USE OF INSULIN: Primary | ICD-10-CM

## 2018-02-21 DIAGNOSIS — R53.1 GENERALIZED WEAKNESS: ICD-10-CM

## 2018-02-21 DIAGNOSIS — Z79.4 TYPE 2 DIABETES MELLITUS WITH STAGE 3 CHRONIC KIDNEY DISEASE, WITH LONG-TERM CURRENT USE OF INSULIN: ICD-10-CM

## 2018-02-21 DIAGNOSIS — E11.22 TYPE 2 DIABETES MELLITUS WITH STAGE 3 CHRONIC KIDNEY DISEASE, WITH LONG-TERM CURRENT USE OF INSULIN: ICD-10-CM

## 2018-02-21 LAB
ALBUMIN SERPL BCP-MCNC: 2.9 G/DL
ALP SERPL-CCNC: 122 U/L
ALT SERPL W/O P-5'-P-CCNC: 12 U/L
ANION GAP SERPL CALC-SCNC: 9 MMOL/L
AST SERPL-CCNC: 20 U/L
BASOPHILS # BLD AUTO: 0.04 K/UL
BASOPHILS NFR BLD: 0.8 %
BILIRUB SERPL-MCNC: 0.1 MG/DL
BUN SERPL-MCNC: 12 MG/DL
CALCIUM SERPL-MCNC: 8.6 MG/DL
CHLORIDE SERPL-SCNC: 103 MMOL/L
CO2 SERPL-SCNC: 28 MMOL/L
CREAT SERPL-MCNC: 0.9 MG/DL
DIFFERENTIAL METHOD: ABNORMAL
EOSINOPHIL # BLD AUTO: 0.1 K/UL
EOSINOPHIL NFR BLD: 1.7 %
ERYTHROCYTE [DISTWIDTH] IN BLOOD BY AUTOMATED COUNT: 16.5 %
EST. GFR  (AFRICAN AMERICAN): >60 ML/MIN/1.73 M^2
EST. GFR  (NON AFRICAN AMERICAN): >60 ML/MIN/1.73 M^2
ESTIMATED AVG GLUCOSE: 169 MG/DL
GLUCOSE SERPL-MCNC: 155 MG/DL
HBA1C MFR BLD HPLC: 7.5 %
HCT VFR BLD AUTO: 31.1 %
HGB BLD-MCNC: 9.5 G/DL
IMM GRANULOCYTES # BLD AUTO: 0.01 K/UL
IMM GRANULOCYTES NFR BLD AUTO: 0.2 %
LYMPHOCYTES # BLD AUTO: 1.4 K/UL
LYMPHOCYTES NFR BLD: 26.4 %
MCH RBC QN AUTO: 24.2 PG
MCHC RBC AUTO-ENTMCNC: 30.5 G/DL
MCV RBC AUTO: 79 FL
MONOCYTES # BLD AUTO: 0.2 K/UL
MONOCYTES NFR BLD: 4.1 %
NEUTROPHILS # BLD AUTO: 3.5 K/UL
NEUTROPHILS NFR BLD: 66.8 %
NRBC BLD-RTO: 0 /100 WBC
PLATELET # BLD AUTO: 311 K/UL
PMV BLD AUTO: 11.8 FL
POTASSIUM SERPL-SCNC: 4.1 MMOL/L
PROT SERPL-MCNC: 7.9 G/DL
RBC # BLD AUTO: 3.92 M/UL
SODIUM SERPL-SCNC: 140 MMOL/L
T4 FREE SERPL-MCNC: 1.04 NG/DL
TSH SERPL DL<=0.005 MIU/L-ACNC: 4.58 UIU/ML
WBC # BLD AUTO: 5.18 K/UL

## 2018-02-21 PROCEDURE — 1159F MED LIST DOCD IN RCRD: CPT | Mod: S$GLB,,, | Performed by: FAMILY MEDICINE

## 2018-02-21 PROCEDURE — 3008F BODY MASS INDEX DOCD: CPT | Mod: S$GLB,,, | Performed by: FAMILY MEDICINE

## 2018-02-21 PROCEDURE — 84443 ASSAY THYROID STIM HORMONE: CPT

## 2018-02-21 PROCEDURE — 99999 PR PBB SHADOW E&M-EST. PATIENT-LVL IV: CPT | Mod: PBBFAC,,, | Performed by: FAMILY MEDICINE

## 2018-02-21 PROCEDURE — 36415 COLL VENOUS BLD VENIPUNCTURE: CPT | Mod: PO

## 2018-02-21 PROCEDURE — 90670 PCV13 VACCINE IM: CPT | Mod: S$GLB,,, | Performed by: FAMILY MEDICINE

## 2018-02-21 PROCEDURE — 83036 HEMOGLOBIN GLYCOSYLATED A1C: CPT

## 2018-02-21 PROCEDURE — 80053 COMPREHEN METABOLIC PANEL: CPT

## 2018-02-21 PROCEDURE — G0009 ADMIN PNEUMOCOCCAL VACCINE: HCPCS | Mod: S$GLB,,, | Performed by: FAMILY MEDICINE

## 2018-02-21 PROCEDURE — 99214 OFFICE O/P EST MOD 30 MIN: CPT | Mod: S$GLB,,, | Performed by: FAMILY MEDICINE

## 2018-02-21 PROCEDURE — 85025 COMPLETE CBC W/AUTO DIFF WBC: CPT

## 2018-02-21 PROCEDURE — 84439 ASSAY OF FREE THYROXINE: CPT

## 2018-02-21 RX ORDER — DOXYCYCLINE 50 MG/1
CAPSULE ORAL
COMMUNITY
Start: 2018-01-30 | End: 2018-05-30

## 2018-02-21 NOTE — TELEPHONE ENCOUNTER
----- Message from Lenka Bedoya LCSW sent at 2/21/2018  2:45 PM CST -----  Thank you for the referral.  Patient has been assigned to MOJGAN Greer for low risk screening for Outpatient Case Management.     Reason for referral: Malignant neoplasm of areola of left breast in female, estrogen receptor negative    Please contact [facility] at [phone]. [phone] with any questions.    Thank you,    Lenka Bedoya LCSW, CCM

## 2018-02-21 NOTE — PROGRESS NOTES
Patient was given Prevnar 13 IM in Right Deltoid as per orders from Dr. Harper. Aseptic tech used and pt tolerated well. Pt was monitored for 15 mins with no reaction noted.

## 2018-02-21 NOTE — PROGRESS NOTES
Thank you for the referral.  Patient has been assigned to MOJGAN Greer for low risk screening for Outpatient Case Management.     Reason for referral: Malignant neoplasm of areola of left breast in female, estrogen receptor negative    Please contact [facility] at [phone]. [phone] with any questions.    Thank you,    Lenka Bedoya LCSW, CCM

## 2018-02-22 ENCOUNTER — TELEPHONE (OUTPATIENT)
Dept: FAMILY MEDICINE | Facility: CLINIC | Age: 78
End: 2018-02-22

## 2018-02-22 NOTE — TELEPHONE ENCOUNTER
Patient's daughter was given test results.  The patient will drop off her urine sample on tomorrow and also schedule her 1 month follow up.Thanks.

## 2018-02-22 NOTE — TELEPHONE ENCOUNTER
----- Message from Anne Harper MD sent at 2/22/2018  7:33 AM CST -----  Nothing acute on labs however please hve pt come in next month for lab follow up

## 2018-02-23 ENCOUNTER — OUTPATIENT CASE MANAGEMENT (OUTPATIENT)
Dept: ADMINISTRATIVE | Facility: OTHER | Age: 78
End: 2018-02-23

## 2018-02-23 ENCOUNTER — TELEPHONE (OUTPATIENT)
Dept: FAMILY MEDICINE | Facility: CLINIC | Age: 78
End: 2018-02-23

## 2018-02-23 ENCOUNTER — LAB VISIT (OUTPATIENT)
Dept: LAB | Facility: HOSPITAL | Age: 78
End: 2018-02-23
Attending: FAMILY MEDICINE
Payer: MEDICARE

## 2018-02-23 ENCOUNTER — OFFICE VISIT (OUTPATIENT)
Dept: SURGERY | Facility: CLINIC | Age: 78
End: 2018-02-23
Payer: MEDICARE

## 2018-02-23 VITALS
TEMPERATURE: 97 F | SYSTOLIC BLOOD PRESSURE: 128 MMHG | HEIGHT: 65 IN | HEART RATE: 63 BPM | WEIGHT: 260.56 LBS | BODY MASS INDEX: 43.41 KG/M2 | DIASTOLIC BLOOD PRESSURE: 55 MMHG

## 2018-02-23 DIAGNOSIS — I10 HTN (HYPERTENSION), BENIGN: ICD-10-CM

## 2018-02-23 DIAGNOSIS — Z79.4 TYPE 2 DIABETES MELLITUS WITH STAGE 3 CHRONIC KIDNEY DISEASE, WITH LONG-TERM CURRENT USE OF INSULIN: ICD-10-CM

## 2018-02-23 DIAGNOSIS — N18.30 TYPE 2 DIABETES MELLITUS WITH STAGE 3 CHRONIC KIDNEY DISEASE, WITH LONG-TERM CURRENT USE OF INSULIN: ICD-10-CM

## 2018-02-23 DIAGNOSIS — N95.9 POSTMENOPAUSAL SYMPTOMS: ICD-10-CM

## 2018-02-23 DIAGNOSIS — Z17.1 MALIGNANT NEOPLASM OF UPPER-OUTER QUADRANT OF LEFT BREAST IN FEMALE, ESTROGEN RECEPTOR NEGATIVE: Primary | ICD-10-CM

## 2018-02-23 DIAGNOSIS — E11.22 TYPE 2 DIABETES MELLITUS WITH STAGE 3 CHRONIC KIDNEY DISEASE, WITH LONG-TERM CURRENT USE OF INSULIN: ICD-10-CM

## 2018-02-23 DIAGNOSIS — C50.412 MALIGNANT NEOPLASM OF UPPER-OUTER QUADRANT OF LEFT BREAST IN FEMALE, ESTROGEN RECEPTOR NEGATIVE: Primary | ICD-10-CM

## 2018-02-23 LAB
CREAT UR-MCNC: 115 MG/DL
MICROALBUMIN UR DL<=1MG/L-MCNC: 25 UG/ML
MICROALBUMIN/CREATININE RATIO: 21.7 UG/MG

## 2018-02-23 PROCEDURE — 99024 POSTOP FOLLOW-UP VISIT: CPT | Mod: S$GLB,,, | Performed by: SURGERY

## 2018-02-23 PROCEDURE — 82043 UR ALBUMIN QUANTITATIVE: CPT

## 2018-02-23 PROCEDURE — 99999 PR PBB SHADOW E&M-EST. PATIENT-LVL III: CPT | Mod: PBBFAC,,, | Performed by: SURGERY

## 2018-02-23 NOTE — PROGRESS NOTES
Attempt #:  1  This CSW attempted to reach patient/caregiver to provide resource. Caregiver reports she was not at home. Caregiver will contact this CSW later on today.      This Patient is known to this CSW .Caregiver reports patient needs additional assistance within the  home such as PT, OT and  aide. Daughter believes these services would be beneficial for this  patient . CSW explained to daughter HH is not intended to stay in the home forever . Daughter voiced understanding, however believes patient would benefit from these services. CSW sent an in basket message to PCP to please place orders if she is an agreement with request. Daughter reports patient recently switched PCP, due to transportation issues. Daughter reports an RTA application has been completed and is now awaiting decision. Daughter reports patient is unable to afford a hired caregiver to assist at this time. Daughter reports NH placement will not be an option for patient. Caregiver reports patient was denied for Medicaid. Daughter reports patient has to be discharged from wound care nurse before she can receive services through the PACE program. Daughter states there are no additional needs at this time.

## 2018-02-23 NOTE — TELEPHONE ENCOUNTER
----- Message from MOJGAN Harrington sent at 2/23/2018  3:56 PM CST -----  This CSW received a referral on the above patient. I have previously provided this patient or caregiver with resources regarding additional care in the home. Patient daughter is asking for HH services. Daughter believes these services would be beneficial to patient. Dr. Montoya please place orders if an agreement with request.       Thank you for the referral,    MOJGAN Manzano

## 2018-02-26 NOTE — TELEPHONE ENCOUNTER
Melinda please contact [facility] to confirm that referral has been received and that a HH nurse has been assigned to the patient. Thanks

## 2018-02-26 NOTE — PROGRESS NOTES
S/p mastectomy for hygiene, drainage improved no fevers  Completed antibiotics  Culture with MRSA and Proteus, history of ESBL    Inc with thin drainage minimal, no erythema or tenderness  Superficial dehiscence is larger aprpox 2 cm with tunneling laterally 5 cm and medially 2 cm  Will pack, clarify orders with  HH  F/up 2 week

## 2018-02-28 NOTE — PROGRESS NOTES
"Subjective:       Patient ID: Cece Buchanan is a 78 y.o. female.    Chief Complaint: Hypertension    HPI   Pt is here for follow up of htn stable on norvasc an b blocker no excessive fatigue no ankle swelling bp fine today   Pt has dm stable on metformin no adverse gi side effects fbs in the  Mid 100's  Pt has h/o breast ca followed in breast surgery for reconstruction c/o generalized weakness nothing acute however   Review of Systems   Constitutional: Negative for chills, fatigue and fever.   Respiratory: Negative for cough, chest tightness and shortness of breath.    Cardiovascular: Negative for chest pain and palpitations.   Gastrointestinal: Negative for abdominal distention and abdominal pain.   Endocrine: Negative for polydipsia, polyphagia and polyuria.   Neurological: Positive for weakness.       Objective:      Physical Exam   Constitutional: She appears well-developed and well-nourished. No distress.   Cardiovascular: Normal rate and regular rhythm.  Exam reveals no gallop.    Pulmonary/Chest: Effort normal and breath sounds normal. No respiratory distress. She has no rales.   Abdominal: Soft. Bowel sounds are normal. She exhibits no distension.     labs discussed with pt   Assessment:       1. Type 2 diabetes mellitus with stage 3 chronic kidney disease, with long-term current use of insulin    2. Postmenopausal symptoms    3. HTN (hypertension), benign    4. Generalized weakness    5. Malignant neoplasm of central portion of left breast in female, estrogen receptor negative        Plan:     orders cmp lipid hgb a1c  Cont meds  Low fat low salt ada diet  Graded exercise  F/u surgeon  rtc quarterly        "This note will not be shared with the patient."   "

## 2018-03-01 ENCOUNTER — TELEPHONE (OUTPATIENT)
Dept: SURGERY | Facility: CLINIC | Age: 78
End: 2018-03-01

## 2018-03-01 NOTE — TELEPHONE ENCOUNTER
03/01/2018              920  Attempted to contact Allegra bolanos/ [facility] to verify that Mrs. Buchanan is having wound changes on Monday, Wednesday, and Friday. No answer. Left voicemail.

## 2018-03-06 ENCOUNTER — TELEPHONE (OUTPATIENT)
Dept: ADMINISTRATIVE | Facility: CLINIC | Age: 78
End: 2018-03-06

## 2018-03-06 NOTE — PATIENT INSTRUCTIONS
Good  Morning,    Home health is requesting orders:      STAGE 2 PRESSURE ULCER RIGHT BUTTOCK, CG APPLYING CALMOSEPTINE CREAM requesting orders to continue with Calmoseptine andmonitor for ss of infection

## 2018-03-08 ENCOUNTER — HOSPITAL ENCOUNTER (OUTPATIENT)
Dept: RADIOLOGY | Facility: CLINIC | Age: 78
Discharge: HOME | End: 2018-03-08
Attending: FAMILY MEDICINE
Payer: MEDICARE

## 2018-03-08 DIAGNOSIS — N95.9 POSTMENOPAUSAL SYMPTOMS: ICD-10-CM

## 2018-03-08 PROCEDURE — 77080 DXA BONE DENSITY AXIAL: CPT | Mod: TC

## 2018-03-08 PROCEDURE — 77080 DXA BONE DENSITY AXIAL: CPT | Mod: 26,,, | Performed by: INTERNAL MEDICINE

## 2018-03-14 ENCOUNTER — TELEPHONE (OUTPATIENT)
Dept: ADMINISTRATIVE | Facility: CLINIC | Age: 78
End: 2018-03-14

## 2018-03-14 NOTE — PATIENT INSTRUCTIONS
Home health notification:      SN notes 3 intact blisters to left chest. Sn started wound care with betadine. Please contact with any other wound care orders  5. Level of urgency- low      6. Call back info cell/office/email [email or web address] or [phone]  7. Whom to call back: clinician        Thanks

## 2018-03-21 ENCOUNTER — OFFICE VISIT (OUTPATIENT)
Dept: SURGERY | Facility: CLINIC | Age: 78
End: 2018-03-21
Payer: MEDICARE

## 2018-03-21 VITALS
BODY MASS INDEX: 43.14 KG/M2 | DIASTOLIC BLOOD PRESSURE: 79 MMHG | HEART RATE: 63 BPM | TEMPERATURE: 97 F | WEIGHT: 258.94 LBS | HEIGHT: 65 IN | SYSTOLIC BLOOD PRESSURE: 158 MMHG

## 2018-03-21 DIAGNOSIS — C50.112 MALIGNANT NEOPLASM OF CENTRAL PORTION OF LEFT BREAST IN FEMALE, ESTROGEN RECEPTOR NEGATIVE: Primary | ICD-10-CM

## 2018-03-21 DIAGNOSIS — Z17.1 MALIGNANT NEOPLASM OF CENTRAL PORTION OF LEFT BREAST IN FEMALE, ESTROGEN RECEPTOR NEGATIVE: Primary | ICD-10-CM

## 2018-03-21 PROCEDURE — 99999 PR PBB SHADOW E&M-EST. PATIENT-LVL III: CPT | Mod: PBBFAC,,, | Performed by: SURGERY

## 2018-03-21 PROCEDURE — 99024 POSTOP FOLLOW-UP VISIT: CPT | Mod: S$GLB,,, | Performed by: SURGERY

## 2018-04-17 DIAGNOSIS — G30.1 LATE ONSET ALZHEIMER'S DISEASE WITH BEHAVIORAL DISTURBANCE: ICD-10-CM

## 2018-04-17 DIAGNOSIS — F02.818 LATE ONSET ALZHEIMER'S DISEASE WITH BEHAVIORAL DISTURBANCE: ICD-10-CM

## 2018-04-17 DIAGNOSIS — E03.9 ACQUIRED HYPOTHYROIDISM: ICD-10-CM

## 2018-04-17 DIAGNOSIS — K21.9 GASTROESOPHAGEAL REFLUX DISEASE, ESOPHAGITIS PRESENCE NOT SPECIFIED: ICD-10-CM

## 2018-04-17 DIAGNOSIS — I10 ESSENTIAL HYPERTENSION: ICD-10-CM

## 2018-04-17 RX ORDER — LEVOTHYROXINE SODIUM 50 UG/1
50 TABLET ORAL DAILY
Qty: 90 TABLET | Refills: 2 | Status: SHIPPED | OUTPATIENT
Start: 2018-04-17 | End: 2019-10-22 | Stop reason: SDUPTHER

## 2018-04-17 RX ORDER — HALOPERIDOL 2 MG/1
2 TABLET ORAL DAILY
Qty: 90 TABLET | Refills: 3 | Status: SHIPPED | OUTPATIENT
Start: 2018-04-17 | End: 2018-04-21 | Stop reason: SDUPTHER

## 2018-04-17 RX ORDER — MEMANTINE HYDROCHLORIDE 5 MG/1
5 TABLET ORAL 2 TIMES DAILY
Qty: 90 TABLET | Refills: 2 | Status: SHIPPED | OUTPATIENT
Start: 2018-04-17 | End: 2018-04-24 | Stop reason: SDUPTHER

## 2018-04-17 RX ORDER — ESCITALOPRAM OXALATE 10 MG/1
10 TABLET ORAL DAILY
Qty: 90 TABLET | Refills: 2 | Status: SHIPPED | OUTPATIENT
Start: 2018-04-17 | End: 2019-10-30

## 2018-04-17 RX ORDER — LISINOPRIL 20 MG/1
20 TABLET ORAL DAILY
Qty: 90 TABLET | Refills: 2 | Status: SHIPPED | OUTPATIENT
Start: 2018-04-17 | End: 2019-10-30

## 2018-04-17 RX ORDER — PANTOPRAZOLE SODIUM 40 MG/1
40 TABLET, DELAYED RELEASE ORAL DAILY
Qty: 90 TABLET | Refills: 2 | Status: SHIPPED | OUTPATIENT
Start: 2018-04-17 | End: 2019-10-22 | Stop reason: SDUPTHER

## 2018-04-20 ENCOUNTER — TELEPHONE (OUTPATIENT)
Dept: SURGERY | Facility: CLINIC | Age: 78
End: 2018-04-20

## 2018-04-20 NOTE — TELEPHONE ENCOUNTER
04/20/18                                    1028  Attempted to contact the patient's daughter to inform her that I spoke to the , and no 's license or insurance cards had been left or found. No answer. Left voicemail.

## 2018-04-21 DIAGNOSIS — G30.1 LATE ONSET ALZHEIMER'S DISEASE WITH BEHAVIORAL DISTURBANCE: ICD-10-CM

## 2018-04-21 DIAGNOSIS — F02.818 LATE ONSET ALZHEIMER'S DISEASE WITH BEHAVIORAL DISTURBANCE: ICD-10-CM

## 2018-04-21 RX ORDER — HALOPERIDOL 2 MG/1
TABLET ORAL
Qty: 30 TABLET | Refills: 0 | Status: SHIPPED | OUTPATIENT
Start: 2018-04-21 | End: 2019-10-22 | Stop reason: SDUPTHER

## 2018-04-24 DIAGNOSIS — E11.22 TYPE 2 DIABETES MELLITUS WITH STAGE 3 CHRONIC KIDNEY DISEASE, WITH LONG-TERM CURRENT USE OF INSULIN: ICD-10-CM

## 2018-04-24 DIAGNOSIS — G30.1 LATE ONSET ALZHEIMER'S DISEASE WITH BEHAVIORAL DISTURBANCE: ICD-10-CM

## 2018-04-24 DIAGNOSIS — N18.30 TYPE 2 DIABETES MELLITUS WITH STAGE 3 CHRONIC KIDNEY DISEASE, WITH LONG-TERM CURRENT USE OF INSULIN: ICD-10-CM

## 2018-04-24 DIAGNOSIS — Z79.4 TYPE 2 DIABETES MELLITUS WITH STAGE 3 CHRONIC KIDNEY DISEASE, WITH LONG-TERM CURRENT USE OF INSULIN: ICD-10-CM

## 2018-04-24 DIAGNOSIS — F02.818 LATE ONSET ALZHEIMER'S DISEASE WITH BEHAVIORAL DISTURBANCE: ICD-10-CM

## 2018-04-24 RX ORDER — MEMANTINE HYDROCHLORIDE 5 MG/1
5 TABLET ORAL 2 TIMES DAILY
Qty: 90 TABLET | Refills: 2 | Status: SHIPPED | OUTPATIENT
Start: 2018-04-24 | End: 2019-10-22 | Stop reason: SDUPTHER

## 2018-04-24 RX ORDER — ATORVASTATIN CALCIUM 20 MG/1
20 TABLET, FILM COATED ORAL DAILY
Qty: 90 TABLET | Refills: 1 | Status: SHIPPED | OUTPATIENT
Start: 2018-04-24 | End: 2019-10-22 | Stop reason: SDUPTHER

## 2018-04-24 NOTE — TELEPHONE ENCOUNTER
----- Message from Shanta Kamara sent at 4/24/2018 12:36 PM CDT -----  Contact: Sherly Garcia  Can the clinic reply in [facility]: No      Please refill the medication(s) listed below. Please call the patient when the prescription(s) is ready for  at the phone number (_[phone]) .    Medication #1atorvastatin (LIPITOR) 20 MG tablet    Medication #2 memantine (NAMENDA) 5 MG Tab    Preferred Pharmacy:[facility]/PHARMACY #[id number] - [place] - [address]

## 2018-04-25 DIAGNOSIS — N18.30 TYPE 2 DIABETES MELLITUS WITH STAGE 3 CHRONIC KIDNEY DISEASE, WITH LONG-TERM CURRENT USE OF INSULIN: ICD-10-CM

## 2018-04-25 DIAGNOSIS — E11.22 TYPE 2 DIABETES MELLITUS WITH STAGE 3 CHRONIC KIDNEY DISEASE, WITH LONG-TERM CURRENT USE OF INSULIN: ICD-10-CM

## 2018-04-25 DIAGNOSIS — Z79.4 TYPE 2 DIABETES MELLITUS WITH STAGE 3 CHRONIC KIDNEY DISEASE, WITH LONG-TERM CURRENT USE OF INSULIN: ICD-10-CM

## 2018-04-25 RX ORDER — INSULIN DEGLUDEC 200 U/ML
24 INJECTION, SOLUTION SUBCUTANEOUS NIGHTLY
Qty: 12 ML | Refills: 3 | Status: SHIPPED | OUTPATIENT
Start: 2018-04-25 | End: 2018-05-30 | Stop reason: ALTCHOICE

## 2018-04-26 ENCOUNTER — TELEPHONE (OUTPATIENT)
Dept: SURGERY | Facility: CLINIC | Age: 78
End: 2018-04-26

## 2018-04-26 NOTE — TELEPHONE ENCOUNTER
04/26/2018              1400  Attempted to contact pt regarding rescheduling her appt for tomorrow due to Dr. Solares being out of the office. No answer. Left voicemail.

## 2018-04-27 ENCOUNTER — TELEPHONE (OUTPATIENT)
Dept: SURGERY | Facility: CLINIC | Age: 78
End: 2018-04-27

## 2018-04-27 NOTE — TELEPHONE ENCOUNTER
04/27/2018              809  Spoke w/ pt's daughter Zainab, and informed her that Dr. Solares would be out of the office today and we would have to reschedule her appt. Offered pt 05/01/2018 @ 1100. Pt accepted. Confirmed appt date and time. Apologized for the inconvenience. Pt verbalized understanding.

## 2018-05-01 ENCOUNTER — OFFICE VISIT (OUTPATIENT)
Dept: SURGERY | Facility: CLINIC | Age: 78
End: 2018-05-01
Payer: MEDICARE

## 2018-05-01 VITALS
HEART RATE: 65 BPM | BODY MASS INDEX: 42.63 KG/M2 | DIASTOLIC BLOOD PRESSURE: 77 MMHG | HEIGHT: 65 IN | WEIGHT: 255.88 LBS | SYSTOLIC BLOOD PRESSURE: 132 MMHG | TEMPERATURE: 97 F

## 2018-05-01 DIAGNOSIS — C50.112 MALIGNANT NEOPLASM OF CENTRAL PORTION OF LEFT BREAST IN FEMALE, ESTROGEN RECEPTOR NEGATIVE: Primary | ICD-10-CM

## 2018-05-01 DIAGNOSIS — Z17.1 MALIGNANT NEOPLASM OF CENTRAL PORTION OF LEFT BREAST IN FEMALE, ESTROGEN RECEPTOR NEGATIVE: Primary | ICD-10-CM

## 2018-05-01 PROCEDURE — 99999 PR PBB SHADOW E&M-EST. PATIENT-LVL III: CPT | Mod: PBBFAC,,, | Performed by: SURGERY

## 2018-05-01 PROCEDURE — 99024 POSTOP FOLLOW-UP VISIT: CPT | Mod: S$GLB,,, | Performed by: SURGERY

## 2018-05-02 NOTE — PROGRESS NOTES
Continues packing with HH  No evid of ongoing infection  Healing well  Size 11mm depth, 8mm x 4 mm  Sx minimal   continue care with 3x/week aquacel dressing change

## 2018-05-22 ENCOUNTER — TELEPHONE (OUTPATIENT)
Dept: SURGERY | Facility: CLINIC | Age: 78
End: 2018-05-22

## 2018-05-22 NOTE — TELEPHONE ENCOUNTER
----- Message from Marixa Wild sent at 5/22/2018  8:51 AM CDT -----  Contact: clifton with [employer] [phone] ref # [id number]  Rep wanted to verify the status of the detail written order they faxed over for the patient wound care supplies. Please call and advise.      05/22/18                                 1131  Attempted to contact Clifton wit [employer]. No answer. Written orders to be faxed.

## 2018-05-29 ENCOUNTER — PATIENT OUTREACH (OUTPATIENT)
Dept: ADMINISTRATIVE | Facility: HOSPITAL | Age: 78
End: 2018-05-29

## 2018-05-29 NOTE — PROGRESS NOTES
[facility] is committed to your overall health.  To help you get the most out of each of your visits, we will review your information to make sure you are up to date on all of your recommended tests and/or procedures.       Your PCP  Anne Harper MD   found that you may be due for:       Health Maintenance Due   Topic Date Due    Foot Exam  02/15/1950    Eye Exam  02/15/1950             If you have had any of the above done at another facility, please bring the records or information with you so that your record at [facility] will be complete.  If you would like to schedule any of these, please contact me.     If you are currently taking medication, please bring it with you to your appointment for review.     Also, if you have any type of Advanced Directives, please bring them with you to your office visit so we may scan them into your chart.       Thank you for Choosing [facility] for your healthcare needs.        Additional Information  If you have questions, you can email [email or web address] or call [phone]  to talk to our [facility] staff. Remember, [facility] is NOT to be used for urgent needs. For medical emergencies, dial 911.

## 2018-05-30 ENCOUNTER — LAB VISIT (OUTPATIENT)
Dept: LAB | Facility: HOSPITAL | Age: 78
End: 2018-05-30
Attending: FAMILY MEDICINE
Payer: MEDICARE

## 2018-05-30 ENCOUNTER — OFFICE VISIT (OUTPATIENT)
Dept: FAMILY MEDICINE | Facility: CLINIC | Age: 78
End: 2018-05-30
Attending: FAMILY MEDICINE
Payer: MEDICARE

## 2018-05-30 ENCOUNTER — OFFICE VISIT (OUTPATIENT)
Dept: SURGERY | Facility: CLINIC | Age: 78
End: 2018-05-30
Payer: MEDICARE

## 2018-05-30 VITALS
WEIGHT: 253.75 LBS | TEMPERATURE: 99 F | DIASTOLIC BLOOD PRESSURE: 67 MMHG | SYSTOLIC BLOOD PRESSURE: 134 MMHG | HEART RATE: 65 BPM | BODY MASS INDEX: 42.28 KG/M2 | HEIGHT: 65 IN

## 2018-05-30 VITALS
HEART RATE: 67 BPM | WEIGHT: 253.69 LBS | SYSTOLIC BLOOD PRESSURE: 118 MMHG | BODY MASS INDEX: 42.27 KG/M2 | DIASTOLIC BLOOD PRESSURE: 84 MMHG | HEIGHT: 65 IN | OXYGEN SATURATION: 95 %

## 2018-05-30 DIAGNOSIS — I10 HTN (HYPERTENSION), BENIGN: ICD-10-CM

## 2018-05-30 DIAGNOSIS — E78.00 HYPERCHOLESTEROLEMIA: ICD-10-CM

## 2018-05-30 DIAGNOSIS — L40.9 PSORIASIS: ICD-10-CM

## 2018-05-30 DIAGNOSIS — E66.01 CLASS 3 SEVERE OBESITY DUE TO EXCESS CALORIES WITH SERIOUS COMORBIDITY AND BODY MASS INDEX (BMI) OF 40.0 TO 44.9 IN ADULT: ICD-10-CM

## 2018-05-30 LAB
ALBUMIN SERPL BCP-MCNC: 3.5 G/DL
ALP SERPL-CCNC: 129 U/L
ALT SERPL W/O P-5'-P-CCNC: 18 U/L
ANION GAP SERPL CALC-SCNC: 7 MMOL/L
AST SERPL-CCNC: 23 U/L
BILIRUB SERPL-MCNC: 0.3 MG/DL
BUN SERPL-MCNC: 16 MG/DL
CALCIUM SERPL-MCNC: 9.1 MG/DL
CHLORIDE SERPL-SCNC: 104 MMOL/L
CO2 SERPL-SCNC: 30 MMOL/L
CREAT SERPL-MCNC: 1.1 MG/DL
EST. GFR  (AFRICAN AMERICAN): 56 ML/MIN/1.73 M^2
EST. GFR  (NON AFRICAN AMERICAN): 48.5 ML/MIN/1.73 M^2
ESTIMATED AVG GLUCOSE: 192 MG/DL
GLUCOSE SERPL-MCNC: 133 MG/DL
HBA1C MFR BLD HPLC: 8.3 %
POTASSIUM SERPL-SCNC: 4 MMOL/L
PROT SERPL-MCNC: 8.7 G/DL
SODIUM SERPL-SCNC: 141 MMOL/L

## 2018-05-30 PROCEDURE — 99214 OFFICE O/P EST MOD 30 MIN: CPT | Mod: 25,S$GLB,, | Performed by: FAMILY MEDICINE

## 2018-05-30 PROCEDURE — 99499 UNLISTED E&M SERVICE: CPT | Mod: S$GLB,,, | Performed by: FAMILY MEDICINE

## 2018-05-30 PROCEDURE — 99213 OFFICE O/P EST LOW 20 MIN: CPT | Mod: S$GLB,,, | Performed by: SURGERY

## 2018-05-30 PROCEDURE — 83036 HEMOGLOBIN GLYCOSYLATED A1C: CPT

## 2018-05-30 PROCEDURE — 3075F SYST BP GE 130 - 139MM HG: CPT | Mod: CPTII,S$GLB,, | Performed by: SURGERY

## 2018-05-30 PROCEDURE — 36415 COLL VENOUS BLD VENIPUNCTURE: CPT | Mod: PO

## 2018-05-30 PROCEDURE — 3078F DIAST BP <80 MM HG: CPT | Mod: CPTII,S$GLB,, | Performed by: SURGERY

## 2018-05-30 PROCEDURE — 80053 COMPREHEN METABOLIC PANEL: CPT

## 2018-05-30 PROCEDURE — 99999 PR PBB SHADOW E&M-EST. PATIENT-LVL III: CPT | Mod: PBBFAC,,, | Performed by: SURGERY

## 2018-05-30 PROCEDURE — 99999 PR PBB SHADOW E&M-EST. PATIENT-LVL V: CPT | Mod: PBBFAC,,, | Performed by: FAMILY MEDICINE

## 2018-05-30 PROCEDURE — 3079F DIAST BP 80-89 MM HG: CPT | Mod: CPTII,S$GLB,, | Performed by: FAMILY MEDICINE

## 2018-05-30 PROCEDURE — 3074F SYST BP LT 130 MM HG: CPT | Mod: CPTII,S$GLB,, | Performed by: FAMILY MEDICINE

## 2018-05-30 PROCEDURE — 96372 THER/PROPH/DIAG INJ SC/IM: CPT | Mod: 59,S$GLB,, | Performed by: FAMILY MEDICINE

## 2018-05-30 RX ORDER — INSULIN GLARGINE 100 [IU]/ML
INJECTION, SOLUTION SUBCUTANEOUS
Qty: 15 ML | Refills: 3
Start: 2018-05-30 | End: 2019-10-22

## 2018-05-30 RX ORDER — METHYLPREDNISOLONE ACETATE 40 MG/ML
40 INJECTION, SUSPENSION INTRA-ARTICULAR; INTRALESIONAL; INTRAMUSCULAR; SOFT TISSUE
Status: COMPLETED | OUTPATIENT
Start: 2018-05-30 | End: 2018-05-30

## 2018-05-30 RX ADMIN — METHYLPREDNISOLONE ACETATE 40 MG: 40 INJECTION, SUSPENSION INTRA-ARTICULAR; INTRALESIONAL; INTRAMUSCULAR; SOFT TISSUE at 11:05

## 2018-05-30 NOTE — PROGRESS NOTES
After attaining consent, in per orders of Dr. Harper , injection of Depo Medrol LOT [id number] exp. 05/2019 given in Right Ventrogluteal by Dimple Ortega. Patient tolerated well and band aid applied. Pt. Instructed to remain in clinic for 15 mins. afterwards, and to report any adverse reactions to me immediately .

## 2018-05-30 NOTE — PROGRESS NOTES
"Subjective:       Patient ID: Cece Buchanan is a 78 y.o. female.    Chief Complaint: Rash    HPI   Pt is here for c/o itchy rash on her extensor surfaces and in her hair pt is not taking anything for this duration many months  Pt has dm stable on insulin no hypoglycemia fbs in the low to mid 100's  Pt has htn on norvasc b blocker no excessive fatigue on ace no cough bp fine today  Pt has hypercholesterolemia stable on statin no muscle aches     Review of Systems   Constitutional: Negative for chills, fatigue and fever.   Respiratory: Negative for cough, chest tightness and shortness of breath.    Cardiovascular: Negative for chest pain and palpitations.   Gastrointestinal: Negative for abdominal distention and abdominal pain.   Endocrine: Negative for polydipsia, polyphagia and polyuria.   Skin: Positive for color change and rash.       Objective:      Physical Exam   Constitutional: She appears well-developed. No distress.   obese   Cardiovascular: Normal rate and regular rhythm.  Exam reveals no gallop.    Pulmonary/Chest: Effort normal and breath sounds normal. She has no rales.   Abdominal: Soft. Bowel sounds are normal. She exhibits no distension. There is no tenderness.     labs discussed with pt   Assessment:       1. Psoriasis    2. Uncontrolled type 2 diabetes mellitus with complication, with long-term current use of insulin    3. HTN (hypertension), benign    4. Hypercholesterolemia        Plan:     orders cmp hgb a1c depomedrol  Cont meds  Ada diet  Weight loss diet  rtc quarterly  F/u derm       "This note will not be shared with the patient."   "

## 2018-05-30 NOTE — PATIENT INSTRUCTIONS
Your test results will be communicated to you via : [facility], Telephone or Letter.   If you have not received your test results in one week, please contact the clinic at [phone].

## 2018-05-31 NOTE — PROGRESS NOTES
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 90 tablet 3    atorvastatin (LIPITOR) 20 MG tablet Take 1 tablet (20 mg total) by mouth once daily. 90 tablet 1    carvedilol (COREG) 12.5 MG tablet Take 1 tablet (12.5 mg total) by mouth 2 (two) times daily with meals. 180 tablet 3    escitalopram oxalate (LEXAPRO) 10 MG tablet Take 1 tablet (10 mg total) by mouth once daily. 90 tablet 2    haloperidol (HALDOL) 2 MG tablet TAKE 1 TABLET BY MOUTH EVERY EVENING 30 tablet 0    insulin glargine (LANTUS SOLOSTAR U-100 INSULIN) 100 unit/mL (3 mL) InPn pen 30 units sq qhs 15 mL 3    levothyroxine (SYNTHROID) 50 MCG tablet Take 1 tablet (50 mcg total) by mouth once daily. 90 tablet 2    lisinopril (PRINIVIL,ZESTRIL) 20 MG tablet Take 1 tablet (20 mg total) by mouth once daily. 90 tablet 2    memantine (NAMENDA) 5 MG Tab Take 1 tablet (5 mg total) by mouth 2 (two) times daily. 90 tablet 2    metFORMIN (GLUCOPHAGE-XR) 500 MG 24 hr tablet       pantoprazole (PROTONIX) 40 MG tablet Take 1 tablet (40 mg total) by mouth once daily. 90 tablet 2     No current facility-administered medications for this visit.        Past Medical History:   Diagnosis Date    Breast cancer     Diabetes mellitus     GERD (gastroesophageal reflux disease)     Hyperlipidemia     Hypertension     Hypothyroidism     Parkinson's disease      Past  "Surgical History:   Procedure Laterality Date    FINGER SURGERY  1980's    HYSTERECTOMY       History reviewed. No pertinent family history.  Social History   Substance Use Topics    Smoking status: Never Smoker    Smokeless tobacco: Never Used    Alcohol use No          Review of Systems   Constitutional: Negative for activity change, appetite change, chills and fever.   HENT: Negative for congestion and sore throat.    Eyes: Negative for photophobia and visual disturbance.   Respiratory: Negative for cough, shortness of breath and wheezing.    Cardiovascular: Negative for chest pain and palpitations.   Gastrointestinal: Negative for abdominal distention and abdominal pain.   Genitourinary: Negative for difficulty urinating, dysuria and frequency.   Musculoskeletal: Negative for gait problem and joint swelling.   Skin: Positive for wound. Negative for rash.   Neurological: Negative for dizziness and headaches.   Hematological: Negative for adenopathy. Does not bruise/bleed easily.   Psychiatric/Behavioral: Negative for dysphoric mood and sleep disturbance.       OBJECTIVE:     Vital Signs (Most Recent)  Temp: 98.8 °F (37.1 °C) (05/30/18 1321)  Pulse: 65 (05/30/18 1321)  BP: 134/67 (05/30/18 1321)  5' 5" (1.651 m)  115.1 kg (253 lb 12 oz)     Physical Exam   Constitutional: She is oriented to person, place, and time. She appears well-developed and well-nourished. No distress.   HENT:   Head: Normocephalic and atraumatic.   Eyes: Conjunctivae and EOM are normal. No scleral icterus.   Neck: Normal range of motion.   Cardiovascular: Normal rate and intact distal pulses.    Pulmonary/Chest: Effort normal. No stridor. No respiratory distress.   Abdominal: Soft. She exhibits no distension. There is no tenderness.   Musculoskeletal: Normal range of motion. She exhibits no edema or tenderness.   Neurological: She is alert and oriented to person, place, and time.   Skin: No rash noted. No pallor.   Mastectomy incision " intact  One area of very superficial dehiscence healing wound, progressing well  Depth 3 mm 7 x 4 mm   Granulation at base   Psychiatric: She has a normal mood and affect. Her behavior is normal.       Laboratory  n/a    Diagnostic Results:  n/a    ASSESSMENT/PLAN:   Wound progressing well,  Continue daily change ok by MA or RN at nursing facility [facility], does not need wound care specialist or MD    Wash wound with saline, apply bactroban ointment and cover with dry gauze.   Change daily and PRN soiling.     If aquacel rope is available, may wash with saline and apply aquacel rope and change every other day.     She is palliative care, and her surgery was not for curative intent.  No further follow up imaging.

## 2018-06-01 RX ORDER — METFORMIN HYDROCHLORIDE 500 MG/1
500 TABLET, EXTENDED RELEASE ORAL 2 TIMES DAILY WITH MEALS
Qty: 180 TABLET | Refills: 3 | Status: SHIPPED | OUTPATIENT
Start: 2018-06-01 | End: 2019-10-22 | Stop reason: SDUPTHER

## 2018-06-06 NOTE — PROGRESS NOTES
Patient, Cece Buchanan (MRN #[id number]), presented with a recorded BMI of 42.22 kg/m^2 consistent with the definition of morbid obesity (ICD-10 E66.01). The patient's morbid obesity was monitored, evaluated, addressed and/or treated. This addendum to the medical record is made on 06/05/2018.

## 2018-06-25 ENCOUNTER — TELEPHONE (OUTPATIENT)
Dept: ADMINISTRATIVE | Facility: CLINIC | Age: 78
End: 2018-06-25

## 2018-06-25 NOTE — PATIENT INSTRUCTIONS
Home Health Recert 05/28/2018 to 07/26/2018 [facility] N.O. Dr Aleisha Solares,  service.

## 2019-10-16 ENCOUNTER — TELEPHONE (OUTPATIENT)
Dept: OPHTHALMOLOGY | Facility: CLINIC | Age: 79
End: 2019-10-16

## 2019-10-16 NOTE — TELEPHONE ENCOUNTER
----- Message from Denisha Coon, OD sent at 10/16/2019  2:28 PM CDT -----  Contact:  Sherly Garcia Daughter   She needs to do her f/u with an ophthalmologist.    ----- Message -----  From: Bella Gunter  Sent: 10/16/2019   1:21 PM CDT  To: Denisha Coon, OD    Pt had cat  sx on one eye Monday 15th was told to follow up with a doctor here.... Would that be optometry or ophthalmology ?  ----- Message -----  From: Gemini Hicks  Sent: 10/16/2019  12:45 PM CDT  To: Sh[facility] D[facility] Staff    Pt needs a follow up appt after having cataract surgery in [place] by another physicans    Pt daughter contact number [phone]

## 2019-10-17 ENCOUNTER — TELEPHONE (OUTPATIENT)
Dept: OPTOMETRY | Facility: CLINIC | Age: 79
End: 2019-10-17

## 2019-10-17 NOTE — TELEPHONE ENCOUNTER
Spoke with daughter again today to let her know that message has been sent to correct team to  set up appt ----- Message from Jessica HOOKER August sent at 10/17/2019  9:58 AM CDT -----  Contact: pt daughter Sherly  Reason: Pt had cataract Surgery in [place] and need to be seen within the next 5-7 days for a f/u. Nothing in Epic Please call to advise Thanks    Communication: Sherly Garcia(daughter) [phone]

## 2019-10-21 ENCOUNTER — TELEPHONE (OUTPATIENT)
Dept: FAMILY MEDICINE | Facility: CLINIC | Age: 79
End: 2019-10-21

## 2019-10-21 NOTE — TELEPHONE ENCOUNTER
----- Message from Frankie Bonilla sent at 10/21/2019  9:24 AM CDT -----  Contact: PAMELA MICHAEL [[id number]]  Name of Who is Calling: PAMELA MICHAEL [[id number]]     What is the request in detail: PAMELA MICHAEL [[id number]] is requesting a call in regards to getting home visits .Patient is requesting an appointment for today... .. Please contact to further discuss and advise      Can the clinic reply by [facility]: NO     What [facility]:  [phone] (Sherly pulido Daughter)

## 2019-10-22 ENCOUNTER — OFFICE VISIT (OUTPATIENT)
Dept: FAMILY MEDICINE | Facility: CLINIC | Age: 79
End: 2019-10-22
Attending: FAMILY MEDICINE
Payer: MEDICARE

## 2019-10-22 ENCOUNTER — LAB VISIT (OUTPATIENT)
Dept: LAB | Facility: HOSPITAL | Age: 79
End: 2019-10-22
Attending: FAMILY MEDICINE
Payer: MEDICARE

## 2019-10-22 VITALS
RESPIRATION RATE: 16 BRPM | DIASTOLIC BLOOD PRESSURE: 61 MMHG | BODY MASS INDEX: 42.15 KG/M2 | HEART RATE: 63 BPM | WEIGHT: 253 LBS | SYSTOLIC BLOOD PRESSURE: 116 MMHG | HEIGHT: 65 IN

## 2019-10-22 DIAGNOSIS — Z00.00 ANNUAL PHYSICAL EXAM: Primary | ICD-10-CM

## 2019-10-22 DIAGNOSIS — I69.398 ABNORMALITY OF GAIT FOLLOWING CEREBROVASCULAR ACCIDENT (CVA): ICD-10-CM

## 2019-10-22 DIAGNOSIS — F02.818 LATE ONSET ALZHEIMER'S DISEASE WITH BEHAVIORAL DISTURBANCE: ICD-10-CM

## 2019-10-22 DIAGNOSIS — I10 HYPERTENSION, ESSENTIAL: ICD-10-CM

## 2019-10-22 DIAGNOSIS — E03.9 ACQUIRED HYPOTHYROIDISM: ICD-10-CM

## 2019-10-22 DIAGNOSIS — Z79.4 TYPE 2 DIABETES MELLITUS WITH STAGE 3 CHRONIC KIDNEY DISEASE, WITH LONG-TERM CURRENT USE OF INSULIN: ICD-10-CM

## 2019-10-22 DIAGNOSIS — E03.9 HYPOTHYROIDISM (ACQUIRED): ICD-10-CM

## 2019-10-22 DIAGNOSIS — G30.1 LATE ONSET ALZHEIMER'S DISEASE WITH BEHAVIORAL DISTURBANCE: ICD-10-CM

## 2019-10-22 DIAGNOSIS — E11.22 TYPE 2 DIABETES MELLITUS WITH STAGE 3 CHRONIC KIDNEY DISEASE, WITH LONG-TERM CURRENT USE OF INSULIN: ICD-10-CM

## 2019-10-22 DIAGNOSIS — N18.30 TYPE 2 DIABETES MELLITUS WITH STAGE 3 CHRONIC KIDNEY DISEASE, WITH LONG-TERM CURRENT USE OF INSULIN: ICD-10-CM

## 2019-10-22 DIAGNOSIS — K21.9 GASTROESOPHAGEAL REFLUX DISEASE, ESOPHAGITIS PRESENCE NOT SPECIFIED: ICD-10-CM

## 2019-10-22 DIAGNOSIS — R26.9 ABNORMALITY OF GAIT FOLLOWING CEREBROVASCULAR ACCIDENT (CVA): ICD-10-CM

## 2019-10-22 DIAGNOSIS — E78.2 MIXED HYPERLIPIDEMIA: ICD-10-CM

## 2019-10-22 LAB
ALBUMIN SERPL BCP-MCNC: 3.5 G/DL (ref 3.5–5.2)
ALP SERPL-CCNC: 90 U/L (ref 55–135)
ALT SERPL W/O P-5'-P-CCNC: 17 U/L (ref 10–44)
ANION GAP SERPL CALC-SCNC: 11 MMOL/L (ref 8–16)
AST SERPL-CCNC: 34 U/L (ref 10–40)
BASOPHILS # BLD AUTO: 0.06 K/UL (ref 0–0.2)
BASOPHILS NFR BLD: 1.2 % (ref 0–1.9)
BILIRUB SERPL-MCNC: 0.3 MG/DL (ref 0.1–1)
BUN SERPL-MCNC: 18 MG/DL (ref 8–23)
CALCIUM SERPL-MCNC: 8.8 MG/DL (ref 8.7–10.5)
CHLORIDE SERPL-SCNC: 106 MMOL/L (ref 95–110)
CO2 SERPL-SCNC: 24 MMOL/L (ref 23–29)
CREAT SERPL-MCNC: 1 MG/DL (ref 0.5–1.4)
DIFFERENTIAL METHOD: ABNORMAL
EOSINOPHIL # BLD AUTO: 0.1 K/UL (ref 0–0.5)
EOSINOPHIL NFR BLD: 1.8 % (ref 0–8)
ERYTHROCYTE [DISTWIDTH] IN BLOOD BY AUTOMATED COUNT: 17.2 % (ref 11.5–14.5)
EST. GFR  (AFRICAN AMERICAN): >60 ML/MIN/1.73 M^2
EST. GFR  (NON AFRICAN AMERICAN): 54.1 ML/MIN/1.73 M^2
ESTIMATED AVG GLUCOSE: 123 MG/DL (ref 68–131)
GLUCOSE SERPL-MCNC: 93 MG/DL (ref 70–110)
HBA1C MFR BLD HPLC: 5.9 % (ref 4–5.6)
HCT VFR BLD AUTO: 34.2 % (ref 37–48.5)
HGB BLD-MCNC: 11.4 G/DL (ref 12–16)
IMM GRANULOCYTES # BLD AUTO: 0.01 K/UL (ref 0–0.04)
IMM GRANULOCYTES NFR BLD AUTO: 0.2 % (ref 0–0.5)
LYMPHOCYTES # BLD AUTO: 1.5 K/UL (ref 1–4.8)
LYMPHOCYTES NFR BLD: 29.1 % (ref 18–48)
MCH RBC QN AUTO: 29 PG (ref 27–31)
MCHC RBC AUTO-ENTMCNC: 33.3 G/DL (ref 32–36)
MCV RBC AUTO: 87 FL (ref 82–98)
MONOCYTES # BLD AUTO: 0.2 K/UL (ref 0.3–1)
MONOCYTES NFR BLD: 3.8 % (ref 4–15)
NEUTROPHILS # BLD AUTO: 3.2 K/UL (ref 1.8–7.7)
NEUTROPHILS NFR BLD: 63.9 % (ref 38–73)
NRBC BLD-RTO: 0 /100 WBC
PLATELET # BLD AUTO: 296 K/UL (ref 150–350)
PMV BLD AUTO: 12.2 FL (ref 9.2–12.9)
POTASSIUM SERPL-SCNC: 4.7 MMOL/L (ref 3.5–5.1)
PROT SERPL-MCNC: 7.9 G/DL (ref 6–8.4)
RBC # BLD AUTO: 3.93 M/UL (ref 4–5.4)
SODIUM SERPL-SCNC: 141 MMOL/L (ref 136–145)
TSH SERPL DL<=0.005 MIU/L-ACNC: 1.53 UIU/ML (ref 0.4–4)
WBC # BLD AUTO: 5.05 K/UL (ref 3.9–12.7)

## 2019-10-22 PROCEDURE — 84443 ASSAY THYROID STIM HORMONE: CPT

## 2019-10-22 PROCEDURE — 99215 OFFICE O/P EST HI 40 MIN: CPT | Mod: PBBFAC,PO | Performed by: FAMILY MEDICINE

## 2019-10-22 PROCEDURE — 80053 COMPREHEN METABOLIC PANEL: CPT

## 2019-10-22 PROCEDURE — 85025 COMPLETE CBC W/AUTO DIFF WBC: CPT

## 2019-10-22 PROCEDURE — 99999 PR PBB SHADOW E&M-EST. PATIENT-LVL V: ICD-10-PCS | Mod: PBBFAC,,, | Performed by: FAMILY MEDICINE

## 2019-10-22 PROCEDURE — 83036 HEMOGLOBIN GLYCOSYLATED A1C: CPT

## 2019-10-22 PROCEDURE — 99999 PR PBB SHADOW E&M-EST. PATIENT-LVL V: CPT | Mod: PBBFAC,,, | Performed by: FAMILY MEDICINE

## 2019-10-22 PROCEDURE — 99214 PR OFFICE/OUTPT VISIT, EST, LEVL IV, 30-39 MIN: ICD-10-PCS | Mod: S$PBB,,, | Performed by: FAMILY MEDICINE

## 2019-10-22 PROCEDURE — 99214 OFFICE O/P EST MOD 30 MIN: CPT | Mod: S$PBB,,, | Performed by: FAMILY MEDICINE

## 2019-10-22 PROCEDURE — 36415 COLL VENOUS BLD VENIPUNCTURE: CPT | Mod: PO

## 2019-10-22 RX ORDER — HALOPERIDOL 2 MG/ML
SOLUTION ORAL
COMMUNITY
Start: 2019-07-22 | End: 2019-10-30 | Stop reason: ALTCHOICE

## 2019-10-22 RX ORDER — LEVOCETIRIZINE DIHYDROCHLORIDE 5 MG/1
5 TABLET, FILM COATED ORAL NIGHTLY
Qty: 30 TABLET | Refills: 0 | Status: SHIPPED | OUTPATIENT
Start: 2019-10-22 | End: 2019-11-22

## 2019-10-22 RX ORDER — CITALOPRAM 10 MG/1
TABLET ORAL
COMMUNITY
Start: 2019-10-17 | End: 2019-10-30 | Stop reason: ALTCHOICE

## 2019-10-22 RX ORDER — POTASSIUM CHLORIDE 750 MG/1
TABLET, EXTENDED RELEASE ORAL
COMMUNITY
Start: 2019-08-07

## 2019-10-22 RX ORDER — GABAPENTIN 100 MG/1
CAPSULE ORAL
COMMUNITY
Start: 2019-10-15

## 2019-10-22 NOTE — PATIENT INSTRUCTIONS
Cece,     We are always striving for excellence. Should you receive a patient experience survey electronically or by mail, we would appreciate if you would take a few moments to give us your feedback. These surveys let us know our strengths as well as areas of opportunity for improvement to better serve you.    Thank you for your time,  Denise Gaona LPN    Your test results will be communicated to you via : [facility], Telephone or Letter.   If you have not received your test results in one week, please contact the clinic at [phone].

## 2019-10-22 NOTE — TELEPHONE ENCOUNTER
----- Message from Iam Garcia sent at 10/22/2019  3:18 PM CDT -----  Contact: PAMELA MICHAEL   Please refill the medication(s) listed below. The patient can be reached at this phone number once it is called into the pharmacy.    Medication #1: atorvastatin (LIPITOR) 20 MG tablet    Medication #2: escitalopram oxalate (LEXAPRO) 10 MG tablet    Medication #3: haloperidol (HALDOL) 2 MG tablet    Medication #4: lisinopril (PRINIVIL,ZESTRIL) 20 MG tablet    Medication #5: levothyroxine (SYNTHROID) 50 MCG tablet    Medication #6: memantine (NAMENDA) 5 MG Tab    Medication #7: metFORMIN (GLUCOPHAGE-XR) 500 MG 24 hr tablet    Medication #8:pantoprazole (PROTONIX) 40 MG tablet     Preferred Pharmacy: [facility] #[id number] - [place] - [address]

## 2019-10-23 ENCOUNTER — TELEPHONE (OUTPATIENT)
Dept: FAMILY MEDICINE | Facility: CLINIC | Age: 79
End: 2019-10-23

## 2019-10-23 RX ORDER — LEVOTHYROXINE SODIUM 50 UG/1
50 TABLET ORAL DAILY
Qty: 90 TABLET | Refills: 2 | Status: SHIPPED | OUTPATIENT
Start: 2019-10-23 | End: 2019-10-30

## 2019-10-23 RX ORDER — MEMANTINE HYDROCHLORIDE 5 MG/1
5 TABLET ORAL 2 TIMES DAILY
Qty: 90 TABLET | Refills: 2 | Status: SHIPPED | OUTPATIENT
Start: 2019-10-23 | End: 2019-10-30

## 2019-10-23 RX ORDER — ATORVASTATIN CALCIUM 20 MG/1
20 TABLET, FILM COATED ORAL DAILY
Qty: 90 TABLET | Refills: 1 | Status: SHIPPED | OUTPATIENT
Start: 2019-10-23 | End: 2019-10-30

## 2019-10-23 RX ORDER — METFORMIN HYDROCHLORIDE 500 MG/1
500 TABLET, EXTENDED RELEASE ORAL 2 TIMES DAILY WITH MEALS
Qty: 180 TABLET | Refills: 3 | Status: SHIPPED | OUTPATIENT
Start: 2019-10-23 | End: 2019-10-30

## 2019-10-23 RX ORDER — HALOPERIDOL 2 MG/1
2 TABLET ORAL NIGHTLY
Qty: 30 TABLET | Refills: 3 | Status: SHIPPED | OUTPATIENT
Start: 2019-10-23

## 2019-10-23 RX ORDER — PANTOPRAZOLE SODIUM 40 MG/1
40 TABLET, DELAYED RELEASE ORAL DAILY
Qty: 90 TABLET | Refills: 2 | Status: SHIPPED | OUTPATIENT
Start: 2019-10-23 | End: 2019-10-30

## 2019-10-23 NOTE — TELEPHONE ENCOUNTER
----- Message from Deanne Trimble sent at 10/23/2019  2:37 PM CDT -----  Contact: Sherly Garcia (Daughter)            Name of Who is Calling: Sherly Garcia (Daughter)      What is the request in detail: Pt's daughter would like a referral for  sent to [facility] for the patient at [phone] and fax is [phone] Attn: Jeanne or Monica. Please contact to further discuss and advise.        Can the clinic reply by [facility]: n      What Number [facility] Back [facility]: [phone]

## 2019-10-27 NOTE — PROGRESS NOTES
Subjective:       Patient ID: Cece Buchanan is a 79 y.o. female.    Chief Complaint: Annual Exam and Diabetes    HPI   Pt is here for annual exam pt has dm on metformin no adverse gi side effects she is not checking her bs consistently.  She has had inconsistent follow up.  No polyuria/dipsia/phagia  Pt has htn on multiple meds ace no cough b blocker no excessive fatigue norvasc bp fine today   Pt has hypercholesterolemia stable on statin no muscle aches  Review of Systems   Constitutional: Negative for activity change, chills, diaphoresis, fatigue and fever.   HENT: Negative for congestion, ear discharge, ear pain, hearing loss, postnasal drip, rhinorrhea, sinus pressure, sneezing, sore throat, trouble swallowing and voice change.    Eyes: Negative for photophobia, discharge, redness, itching and visual disturbance.   Respiratory: Negative for cough, chest tightness, shortness of breath and wheezing.    Cardiovascular: Negative for chest pain, palpitations and leg swelling.   Gastrointestinal: Negative for abdominal pain, anal bleeding, blood in stool, constipation, diarrhea, nausea, rectal pain and vomiting.   Genitourinary: Negative for dyspareunia, dysuria, flank pain, frequency, hematuria, menstrual problem, pelvic pain, urgency, vaginal bleeding and vaginal discharge.   Musculoskeletal: Positive for gait problem. Negative for arthralgias, back pain, joint swelling and neck pain.   Skin: Negative for color change and rash.   Neurological: Positive for weakness. Negative for dizziness, speech difficulty, light-headedness, numbness and headaches.   Hematological: Does not bruise/bleed easily.   Psychiatric/Behavioral: Negative for agitation, confusion, decreased concentration, sleep disturbance and suicidal ideas. The patient is not nervous/anxious.        Objective:      Physical Exam   Constitutional: She appears well-developed and well-nourished.   HENT:   Head: Normocephalic and atraumatic.   Right Ear:  "External ear normal.   Left Ear: External ear normal.   Nose: Nose normal.   Mouth/Throat: Oropharynx is clear and moist.   Eyes: Pupils are equal, round, and reactive to light. Conjunctivae and EOM are normal. Right eye exhibits no discharge. Left eye exhibits no discharge.   Neck: Normal range of motion. Neck supple. No thyromegaly present.   Cardiovascular: Normal rate and regular rhythm. Exam reveals no gallop.   Pulmonary/Chest: Effort normal and breath sounds normal. She has no wheezes. She has no rales.   Abdominal: Soft. Bowel sounds are normal. She exhibits no distension. There is no tenderness. There is no rebound and no guarding.   Musculoskeletal: She exhibits no edema or tenderness.   Lymphadenopathy:     She has no cervical adenopathy.   Neurological: She is alert. No cranial nerve deficit. She exhibits abnormal muscle tone. Coordination normal.   Skin: Skin is warm and dry. No rash noted. No erythema.   Psychiatric: She has a normal mood and affect. Her behavior is normal. Judgment and thought content normal.       Assessment:       1. Annual physical exam    2. Diabetes mellitus type 2, uncontrolled, with complications    3. Hypertension, essential    4. 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 Mixed hyperlipidemia    6. Hypothyroidism (acquired)        Plan:     orders cbc cmp lipid tsh urine hgb a1c micro/creat  Pt is not fasting  Cont meds  Qd testing  Ada low fat low salt diet  Graded exercise  rtc 2 weeks lab follow up    Health maintenance  Flu shot discussed  Tetanus q 10 years  Pneumonia discussed  Shingles discussed       "This note will not be shared with the patient."   "

## 2019-10-30 ENCOUNTER — TELEPHONE (OUTPATIENT)
Dept: FAMILY MEDICINE | Facility: CLINIC | Age: 79
End: 2019-10-30

## 2019-10-30 DIAGNOSIS — G30.1 LATE ONSET ALZHEIMER'S DISEASE WITH BEHAVIORAL DISTURBANCE: ICD-10-CM

## 2019-10-30 DIAGNOSIS — N18.30 TYPE 2 DIABETES MELLITUS WITH STAGE 3 CHRONIC KIDNEY DISEASE, WITH LONG-TERM CURRENT USE OF INSULIN: ICD-10-CM

## 2019-10-30 DIAGNOSIS — E03.9 ACQUIRED HYPOTHYROIDISM: ICD-10-CM

## 2019-10-30 DIAGNOSIS — R53.1 GENERALIZED WEAKNESS: Primary | ICD-10-CM

## 2019-10-30 DIAGNOSIS — F02.818 LATE ONSET ALZHEIMER'S DISEASE WITH BEHAVIORAL DISTURBANCE: ICD-10-CM

## 2019-10-30 DIAGNOSIS — Z79.4 TYPE 2 DIABETES MELLITUS WITH STAGE 3 CHRONIC KIDNEY DISEASE, WITH LONG-TERM CURRENT USE OF INSULIN: ICD-10-CM

## 2019-10-30 DIAGNOSIS — K21.9 GASTROESOPHAGEAL REFLUX DISEASE, ESOPHAGITIS PRESENCE NOT SPECIFIED: ICD-10-CM

## 2019-10-30 DIAGNOSIS — E11.22 TYPE 2 DIABETES MELLITUS WITH STAGE 3 CHRONIC KIDNEY DISEASE, WITH LONG-TERM CURRENT USE OF INSULIN: ICD-10-CM

## 2019-10-30 DIAGNOSIS — I10 ESSENTIAL HYPERTENSION: ICD-10-CM

## 2019-10-30 PROCEDURE — G0180 PR HOME HEALTH MD CERTIFICATION: ICD-10-PCS | Mod: ,,, | Performed by: FAMILY MEDICINE

## 2019-10-30 PROCEDURE — G0180 MD CERTIFICATION HHA PATIENT: HCPCS | Mod: ,,, | Performed by: FAMILY MEDICINE

## 2019-10-30 RX ORDER — INSULIN PUMP SYRINGE, 3 ML
EACH MISCELLANEOUS
Qty: 1 EACH | Refills: 0 | Status: SHIPPED | OUTPATIENT
Start: 2019-10-30 | End: 2019-11-01 | Stop reason: SDUPTHER

## 2019-10-30 RX ORDER — MEMANTINE HYDROCHLORIDE 5 MG/1
5 TABLET ORAL 2 TIMES DAILY
Qty: 90 TABLET | Refills: 3 | Status: SHIPPED | OUTPATIENT
Start: 2019-10-30

## 2019-10-30 RX ORDER — METFORMIN HYDROCHLORIDE 500 MG/1
500 TABLET, EXTENDED RELEASE ORAL 2 TIMES DAILY WITH MEALS
Qty: 180 TABLET | Refills: 3 | Status: SHIPPED | OUTPATIENT
Start: 2019-10-30

## 2019-10-30 RX ORDER — ESCITALOPRAM OXALATE 10 MG/1
10 TABLET ORAL DAILY
Qty: 90 TABLET | Refills: 3 | Status: SHIPPED | OUTPATIENT
Start: 2019-10-30

## 2019-10-30 RX ORDER — LEVOTHYROXINE SODIUM 50 UG/1
50 TABLET ORAL DAILY
Qty: 90 TABLET | Refills: 3 | Status: SHIPPED | OUTPATIENT
Start: 2019-10-30

## 2019-10-30 RX ORDER — ATORVASTATIN CALCIUM 20 MG/1
20 TABLET, FILM COATED ORAL DAILY
Qty: 90 TABLET | Refills: 3 | Status: SHIPPED | OUTPATIENT
Start: 2019-10-30

## 2019-10-30 RX ORDER — AMLODIPINE BESYLATE 5 MG/1
5 TABLET ORAL DAILY
Qty: 90 TABLET | Refills: 3 | Status: SHIPPED | OUTPATIENT
Start: 2019-10-30

## 2019-10-30 RX ORDER — PANTOPRAZOLE SODIUM 40 MG/1
40 TABLET, DELAYED RELEASE ORAL DAILY
Qty: 90 TABLET | Refills: 3 | Status: SHIPPED | OUTPATIENT
Start: 2019-10-30

## 2019-10-30 RX ORDER — LISINOPRIL 20 MG/1
20 TABLET ORAL DAILY
Qty: 90 TABLET | Refills: 3 | Status: SHIPPED | OUTPATIENT
Start: 2019-10-30

## 2019-10-30 RX ORDER — CARVEDILOL 12.5 MG/1
12.5 TABLET ORAL 2 TIMES DAILY WITH MEALS
Qty: 180 TABLET | Refills: 3 | Status: SHIPPED | OUTPATIENT
Start: 2019-10-30

## 2019-10-30 RX ORDER — LANCETS
EACH MISCELLANEOUS
Qty: 100 EACH | Refills: 3 | Status: SHIPPED | OUTPATIENT
Start: 2019-10-30 | End: 2019-11-01 | Stop reason: SDUPTHER

## 2019-10-30 NOTE — TELEPHONE ENCOUNTER
Pt daughter and [facility] inform that per  she sent in meter lancets and strips to [facility] and per  she wants to just start pt with Ot. Nurse and daughter agrees and verbalize.

## 2019-10-30 NOTE — TELEPHONE ENCOUNTER
Scripts went to [facility] as well as a glucometer script and lancets test strips.  The occupation therapy order is in as I would like to start there

## 2019-10-30 NOTE — TELEPHONE ENCOUNTER
----- Message from Melinda Owen sent at 10/30/2019  9:52 AM CDT -----  Contact: Live [phone] [facility]  Nurse is asking for an order for PT/OT as well as a Glucometer. She also states patient need a Home Health Aide in the Home.She doesn't have Lisinopril,Amlodiphine,Lexapro,Carvedilol and Citalopram in the household please refill these.

## 2019-10-31 ENCOUNTER — TELEPHONE (OUTPATIENT)
Dept: FAMILY MEDICINE | Facility: CLINIC | Age: 79
End: 2019-10-31

## 2019-10-31 DIAGNOSIS — Z86.73 HISTORY OF COMPLETED STROKE: Primary | ICD-10-CM

## 2019-10-31 NOTE — TELEPHONE ENCOUNTER
----- Message from Yashira Rabago sent at 10/31/2019  7:48 AM CDT -----  Contact: Sherly Garcia (Daughter)    Name of Who is Calling:Sherly Jose (Daughter)    What is the request in detail: Patient daughter would like a call back regarding updated information on patient chart Please contact to further discuss and advise     Can the clinic reply by [facility]: No     What Number [facility]: [phone]

## 2019-10-31 NOTE — TELEPHONE ENCOUNTER
----- Message from Yashira Rabago sent at 10/31/2019  7:48 AM CDT -----  Contact: Sherly Garcia (Daughter)    Name of Who is Calling:Sherly Jose (Daughter)    What is the request in detail: Patient daughter would like a call back regarding updated information on patient chart Please contact to further discuss and advise     Can the clinic reply by [facility]: No     What Number [facility]: [phone]

## 2019-10-31 NOTE — TELEPHONE ENCOUNTER
The occupation therapy is in.  id like her to do this first.  Also the bed order is in .  Please fax printed orders to the dme of her choice direct orders went to her pharmacy - [facility]

## 2019-10-31 NOTE — TELEPHONE ENCOUNTER
Pt daughter inform per  that OT order is in and  wants for her to just do OT first and pt daughter wants HB  Order for sent to [facility],Orders are fax.

## 2019-11-01 DIAGNOSIS — I10 ESSENTIAL HYPERTENSION: ICD-10-CM

## 2019-11-01 RX ORDER — INSULIN PUMP SYRINGE, 3 ML
EACH MISCELLANEOUS
Qty: 1 EACH | Refills: 0 | Status: SHIPPED | OUTPATIENT
Start: 2019-11-01 | End: 2020-10-31

## 2019-11-01 RX ORDER — LANCETS
EACH MISCELLANEOUS
Qty: 100 EACH | Refills: 3 | Status: SHIPPED | OUTPATIENT
Start: 2019-11-01

## 2019-11-05 ENCOUNTER — TELEPHONE (OUTPATIENT)
Dept: FAMILY MEDICINE | Facility: CLINIC | Age: 79
End: 2019-11-05

## 2019-11-05 ENCOUNTER — PATIENT OUTREACH (OUTPATIENT)
Dept: ADMINISTRATIVE | Facility: OTHER | Age: 79
End: 2019-11-05

## 2019-11-05 DIAGNOSIS — Z79.4 TYPE 2 DIABETES MELLITUS WITH STAGE 3 CHRONIC KIDNEY DISEASE, WITH LONG-TERM CURRENT USE OF INSULIN: Primary | ICD-10-CM

## 2019-11-05 DIAGNOSIS — N18.30 TYPE 2 DIABETES MELLITUS WITH STAGE 3 CHRONIC KIDNEY DISEASE, WITH LONG-TERM CURRENT USE OF INSULIN: Primary | ICD-10-CM

## 2019-11-05 DIAGNOSIS — E11.22 TYPE 2 DIABETES MELLITUS WITH STAGE 3 CHRONIC KIDNEY DISEASE, WITH LONG-TERM CURRENT USE OF INSULIN: Primary | ICD-10-CM

## 2019-11-05 NOTE — TELEPHONE ENCOUNTER
----- Message from Hoa Macedo sent at 11/5/2019 10:13 AM CST -----  Contact: Enriqueta([facility] )  Name of Who is Calling: Enriqueta([facility] )      What is the request in detail: Calling to inform staff that the patient fell yesterday and today .States they also need equipment an referral for pt. Please call to further assist.      Can the clinic reply by [facility]: N       What Number to Call Back if not in [facility]: [phone]

## 2019-11-05 NOTE — TELEPHONE ENCOUNTER
----- Message from Marry Macedo, Patient Care Assistant sent at 11/5/2019 10:00 AM CST -----  Contact: Sherly ( daughter)  Name of Who is Calling: Sherly ( daughter)    What is the request in detail:Requesting a call back in regards of getting a lift, Daughter states mom keep falling on floor and she needs help picking her up, and also to get her in and out of the chair. Please contact to further discuss and advise      Can the clinic reply by [facility]: No    What Number to Call Back if not in [facility]:   [phone]

## 2019-11-06 ENCOUNTER — TELEPHONE (OUTPATIENT)
Dept: FAMILY MEDICINE | Facility: CLINIC | Age: 79
End: 2019-11-06

## 2019-11-06 DIAGNOSIS — G30.1 LATE ONSET ALZHEIMER'S DISEASE WITH BEHAVIORAL DISTURBANCE: Primary | ICD-10-CM

## 2019-11-06 DIAGNOSIS — F02.818 LATE ONSET ALZHEIMER'S DISEASE WITH BEHAVIORAL DISTURBANCE: Primary | ICD-10-CM

## 2019-11-06 DIAGNOSIS — E66.01 CLASS 3 SEVERE OBESITY DUE TO EXCESS CALORIES IN ADULT, UNSPECIFIED BMI, UNSPECIFIED WHETHER SERIOUS COMORBIDITY PRESENT: ICD-10-CM

## 2019-11-06 NOTE — TELEPHONE ENCOUNTER
----- Message from Melinda Owen sent at 11/6/2019  4:29 PM CST -----  Contact: Gay/[facility] [phone]  Please enter Home Health orders for PT/OT.

## 2019-11-06 NOTE — TELEPHONE ENCOUNTER
----- Message from Rosalio Hussein sent at 11/6/2019  1:11 PM CST -----  Contact: Julianna PFEIFFER   Name of Who is Calling: Julianna PFEIFFER     What is the request in detail:Juilanna PFEIFFER  Is requesting orders for a hospital bed and a frankie................  Please contact to further discuss and advise      Can the clinic reply by [facility]:     What Number to Call Back if not in [facility]:  Fax over to [facility] [phone]

## 2019-11-07 ENCOUNTER — EXTERNAL HOME HEALTH (OUTPATIENT)
Dept: HOME HEALTH SERVICES | Facility: HOSPITAL | Age: 79
End: 2019-11-07
Payer: MEDICARE

## 2019-11-07 ENCOUNTER — TELEPHONE (OUTPATIENT)
Dept: HOME HEALTH SERVICES | Facility: HOSPITAL | Age: 79
End: 2019-11-07

## 2019-11-07 ENCOUNTER — OFFICE VISIT (OUTPATIENT)
Dept: CARDIOLOGY | Facility: CLINIC | Age: 79
End: 2019-11-07
Payer: MEDICARE

## 2019-11-07 VITALS — BODY MASS INDEX: 42.1 KG/M2 | WEIGHT: 253 LBS

## 2019-11-07 DIAGNOSIS — I63.9 CEREBROVASCULAR ACCIDENT (CVA), UNSPECIFIED MECHANISM: ICD-10-CM

## 2019-11-07 PROCEDURE — 99999 PR PBB SHADOW E&M-EST. PATIENT-LVL III: CPT | Mod: PBBFAC,,, | Performed by: INTERNAL MEDICINE

## 2019-11-07 PROCEDURE — 99999 PR PBB SHADOW E&M-EST. PATIENT-LVL III: ICD-10-PCS | Mod: PBBFAC,,, | Performed by: INTERNAL MEDICINE

## 2019-11-07 PROCEDURE — 99213 OFFICE O/P EST LOW 20 MIN: CPT | Mod: PBBFAC,PN | Performed by: INTERNAL MEDICINE

## 2019-11-07 PROCEDURE — 99204 PR OFFICE/OUTPT VISIT, NEW, LEVL IV, 45-59 MIN: ICD-10-PCS | Mod: S$PBB,,, | Performed by: INTERNAL MEDICINE

## 2019-11-07 PROCEDURE — 99204 OFFICE O/P NEW MOD 45 MIN: CPT | Mod: S$PBB,,, | Performed by: INTERNAL MEDICINE

## 2019-11-07 NOTE — PROGRESS NOTES
Subjective:    Patient ID:  Cece Buchanan is a 79 y.o. y.o. female who presents for initial visit for Hypertension      This patient had a CVA in August 2019 while she was in [place].  While she was in [place] a loop recorder was placed.  She is brought in by her daughter today thinking that that loop recorder needs to be removed.  The daughter has no information on the loop recorder.  Review of the x-rays of her chest looks like it has a loop recorder however was placed in the lower abdomen as opposed to the upper chest.      Past Medical History:   Diagnosis Date    Breast cancer     Diabetes mellitus     GERD (gastroesophageal reflux disease)     Hx of eye surgery 10/07/2018    pt had surgery in right eye,Stroke affected left eye    Hyperlipidemia     Hypertension     Hypothyroidism     Parkinson's disease     Stroke 08/18/2019        Social History     Socioeconomic History    Marital status:      Spouse name: Not on file    Number of children: Not on file    Years of education: Not on file    Highest education level: Not on file   Occupational History    Not on file   Social Needs    Financial resource strain: Not on file    Food insecurity:     Worry: Not on file     Inability: Not on file    Transportation needs:     Medical: Not on file     Non-medical: Not on file   Tobacco Use    Smoking status: Never Smoker    Smokeless tobacco: Never Used   Substance and Sexual Activity    Alcohol use: No     Alcohol/week: 0.0 standard drinks    Drug use: No    Sexual activity: Not on file   Lifestyle    Physical activity:     Days per week: Not on file     Minutes per session: Not on file    Stress: Not on file   Relationships    Social connections:     Talks on phone: Not on file     Gets together: Not on file     Attends Mu-ism service: Not on file     Active member of club or organization: Not on file     Attends meetings of clubs or organizations: Not on file     Relationship status:  Not on file   Other Topics Concern    Not on file   Social History Narrative    Not on file        No family history on file.     Review of Systems   Constitutional:        Patient is generally decompensated because of her CVA.  However she does eat well.   Respiratory: Negative.    Gastrointestinal: Negative.    Genitourinary: Negative.    Skin:        Patient has generalized psoriasis.   Neurological:        Patient has difficulty ambulating because of her CVA.        Objective:     Wt 114.8 kg (253 lb)   BMI 42.10 kg/m²     Physical Exam   Constitutional:       Cardiovascular: Normal rate, regular rhythm, S1 normal and S2 normal.   No murmur heard.  I searched the patient's lower abdomen where the x-ray showed a loop recorder to be however was unable   Pulmonary/Chest: Effort normal and breath sounds normal.   Skin:        Patient has psoriatic lesions on her skin and her back and her elbows.       Labs:     Lab Results   Component Value Date     10/22/2019    K 4.7 10/22/2019     10/22/2019    CO2 24 10/22/2019    BUN 18 10/22/2019    CREATININE 1.0 10/22/2019    ANIONGAP 11 10/22/2019     Lab Results   Component Value Date    HGBA1C 5.9 (H) 10/22/2019     No results found for: BNP, BNPTRIAGEBLO    Lab Results   Component Value Date    WBC 5.05 10/22/2019    HGB 11.4 (L) 10/22/2019    HCT 34.2 (L) 10/22/2019     10/22/2019    GRAN 3.2 10/22/2019    GRAN 63.9 10/22/2019     Lab Results   Component Value Date    CHOL 243 (H) 11/28/2017    HDL 35 (L) 11/28/2017    LDLCALC 174.8 (H) 11/28/2017    TRIG 166 (H) 11/28/2017         Results for orders placed or performed during the hospital encounter of 11/04/19   EKG 12-lead    Collection Time: 11/04/19 10:50 AM    Narrative    Test Reason : E11.8,E11.65,I10,    Vent. Rate : 068 BPM     Atrial Rate : 068 BPM     P-R Int : 142 ms          QRS Dur : 088 ms      QT Int : 398 ms       P-R-T Axes : 078 -23 -67 degrees     QTc Int : 423 ms    Normal  sinus rhythm  Moderate voltage criteria for LVH, may be normal variant  Nonspecific T wave abnormality  Abnormal ECG  When compared with ECG of 04-JAN-2018 10:05,  No significant change was found  Confirmed by CATHERINE KINGSLEY MD ([id number]) on 11/4/2019 4:39:13 PM    Referred By: ADITHYA BARBA           Confirmed By:CATHERINE KINGSLEY MD        .  Meds:     Current Outpatient Medications:     amLODIPine (NORVASC) 5 MG tablet, Take 1 tablet (5 mg total) by mouth once daily., Disp: 90 tablet, Rfl: 3    atorvastatin (LIPITOR) 20 MG tablet, Take 1 tablet (20 mg total) by mouth once daily., Disp: 90 tablet, Rfl: 3    blood sugar diagnostic Strp, Use to test daily as directed, Disp: 100 strip, Rfl: 3    blood-glucose meter kit, Qd testing, Disp: 1 each, Rfl: 0    carvedilol (COREG) 12.5 MG tablet, Take 1 tablet (12.5 mg total) by mouth 2 (two) times daily with meals., Disp: 180 tablet, Rfl: 3    escitalopram oxalate (LEXAPRO) 10 MG tablet, Take 1 tablet (10 mg total) by mouth once daily., Disp: 90 tablet, Rfl: 3    gabapentin (NEURONTIN) 100 MG capsule, , Disp: , Rfl:     haloperidol (HALDOL) 2 MG tablet, Take 1 tablet (2 mg total) by mouth every evening., Disp: 30 tablet, Rfl: 3    lancets Misc, Qd testing, Disp: 100 each, Rfl: 3    levocetirizine (XYZAL) 5 MG tablet, Take 1 tablet (5 mg total) by mouth every evening., Disp: 30 tablet, Rfl: 0    levothyroxine (SYNTHROID) 50 MCG tablet, Take 1 tablet (50 mcg total) by mouth once daily., Disp: 90 tablet, Rfl: 3    lisinopril (PRINIVIL,ZESTRIL) 20 MG tablet, Take 1 tablet (20 mg total) by mouth once daily., Disp: 90 tablet, Rfl: 3    memantine (NAMENDA) 5 MG Tab, Take 1 tablet (5 mg total) by mouth 2 (two) times daily., Disp: 90 tablet, Rfl: 3    metFORMIN (GLUCOPHAGE-XR) 500 MG 24 hr tablet, Take 1 tablet (500 mg total) by mouth 2 (two) times daily with meals., Disp: 180 tablet, Rfl: 3    pantoprazole (PROTONIX) 40 MG tablet, Take 1 tablet (40 mg total) by mouth once daily.,  Disp: 90 tablet, Rfl: 3    potassium chloride (KLOR-CON) 10 MEQ TbSR, , Disp: , Rfl:       Assessment & Plan:     No problem-specific Assessment & Plan notes found for this encounter.

## 2019-11-07 NOTE — LETTER
November 7, 2019      Anne Harper MD  [address]  [address]  [facility] [address]           [facility]  [address], [facility] [address]  [facility] [address]  Phone: [phone]  Fax: [phone]          Patient: Cece Buchanan   MR Number: [id number]   YOB: 1940   Date of Visit: 11/7/2019       Dear Dr. Anne Harper:    Thank you for referring Cece Buchanan to me for evaluation. Attached you will find relevant portions of my assessment and plan of care.    If you have questions, please do not hesitate to call me. I look forward to following Cece Buchanan along with you.    Sincerely,    Herbert Rojo MD    Enclosure  CC:  No Recipients    If you would like to receive this communication electronically, please contact [email or web address] or [phone] to request more information on [employer] Link access.    For providers and/or their staff who would like to refer a patient to [facility], please contact us through our one-stop-shop provider referral line, [facility], at [phone].    If you feel you have received this communication in error or would no longer like to receive these types of communications, please e-mail [email or web address]

## 2019-11-16 DIAGNOSIS — E66.01 CLASS 2 SEVERE OBESITY DUE TO EXCESS CALORIES WITH SERIOUS COMORBIDITY IN ADULT, UNSPECIFIED BMI: ICD-10-CM

## 2019-11-16 DIAGNOSIS — F01.50 VASCULAR DEMENTIA WITHOUT BEHAVIORAL DISTURBANCE: Primary | ICD-10-CM

## 2019-11-18 ENCOUNTER — PATIENT OUTREACH (OUTPATIENT)
Dept: ADMINISTRATIVE | Facility: OTHER | Age: 79
End: 2019-11-18

## 2019-11-22 ENCOUNTER — TELEPHONE (OUTPATIENT)
Dept: HOME HEALTH SERVICES | Facility: HOSPITAL | Age: 79
End: 2019-11-22

## 2019-11-26 ENCOUNTER — TELEPHONE (OUTPATIENT)
Dept: OPHTHALMOLOGY | Facility: CLINIC | Age: 79
End: 2019-11-26

## 2021-03-15 NOTE — TELEPHONE ENCOUNTER
----- Message from Lilian Goodman LPN sent at 12/6/2017  8:23 AM CST -----  Contact: [phone]/ self       ----- Message -----  From: Angelita Weaver  Sent: 12/6/2017   7:58 AM  To: [facility] Staff    Pt called stating she has an appointment today at 3:30 pm but she is not feeling good . Pt wants to know if Dr Solares can do a phone conference with her instead . Please advise   
12/6/17                        0925  Spoke to the patient's daughter, and informed her that unfortunately, Dr. Solares does not do phone conferences, but that repeat imaging is required. Patient's daughter informed that a PET Scan would be needed, and that patient can follow up after that. Sherly, patient's daughter, requests test be performed at [facility] due to proximity. Appointment scheduled for 12/14/17 at 12pm, but daughter states the appointment wouldn't work due to her own appointments that day. Number provided for Sherly to schedule PET Scan for better chance of coordinating appointments. Sherly verbalized understanding.    
We need to schedule another PET scan, and then she can follow after that.   
15-Mar-2021 08:46

## 2021-06-09 ENCOUNTER — OFFICE VISIT (OUTPATIENT)
Dept: OPHTHALMOLOGY | Facility: CLINIC | Age: 81
End: 2021-06-09
Payer: COMMERCIAL

## 2021-06-09 DIAGNOSIS — H01.00B BLEPHARITIS OF UPPER AND LOWER EYELIDS OF BOTH EYES, UNSPECIFIED TYPE: Primary | ICD-10-CM

## 2021-06-09 DIAGNOSIS — H01.00A BLEPHARITIS OF UPPER AND LOWER EYELIDS OF BOTH EYES, UNSPECIFIED TYPE: Primary | ICD-10-CM

## 2021-06-09 PROCEDURE — 99999 PR PBB SHADOW E&M-EST. PATIENT-LVL III: CPT | Mod: PBBFAC,,, | Performed by: OPHTHALMOLOGY

## 2021-06-09 PROCEDURE — 92004 PR EYE EXAM, NEW PATIENT,COMPREHESV: ICD-10-PCS | Mod: S$GLB,,, | Performed by: OPHTHALMOLOGY

## 2021-06-09 PROCEDURE — 92004 COMPRE OPH EXAM NEW PT 1/>: CPT | Mod: S$GLB,,, | Performed by: OPHTHALMOLOGY

## 2021-06-09 PROCEDURE — 99999 PR PBB SHADOW E&M-EST. PATIENT-LVL III: ICD-10-PCS | Mod: PBBFAC,,, | Performed by: OPHTHALMOLOGY

## 2023-01-30 ENCOUNTER — PATIENT MESSAGE (OUTPATIENT)
Dept: RESEARCH | Facility: HOSPITAL | Age: 83
End: 2023-01-30
Payer: COMMERCIAL

## 2024-04-02 NOTE — TELEPHONE ENCOUNTER
Called Elida at [facility] and Dr. Garcia took over call on the phone and adv hold off on wound car orders pt has breast cancer that is seeping out of her breast the size of his fists and that daughter and pt are seeing a surgeon today at 9 am and surgeon will make that decision for wound care. Elida verbalized understanding.    Mgmt per primary team

## (undated) DEVICE — SUT 2-0 12-18IN SILK

## (undated) DEVICE — PACK BASIC

## (undated) DEVICE — SUT 2/0 30IN SILK BLK BRAI

## (undated) DEVICE — SEE MEDLINE ITEM 146313

## (undated) DEVICE — EVACUATOR WOUND BULB 100CC

## (undated) DEVICE — SUT ETHILON 3-0 PS2 18 BLK

## (undated) DEVICE — SEE MEDLINE ITEM 156955

## (undated) DEVICE — GLOVE BIOGEL ECLIPSE SZ 6

## (undated) DEVICE — MANIFOLD 4 PORT

## (undated) DEVICE — SUT 2/0 36IN COATED VICRYL

## (undated) DEVICE — DRAIN SIL RND HUBLSS 19F TRCR

## (undated) DEVICE — BLADE PEAK PLASMA

## (undated) DEVICE — APPLIER LIGACLIP SM 9.38IN

## (undated) DEVICE — COVER OVERHEAD SURG LT BLUE

## (undated) DEVICE — BLADE ELECTRO EDGE INSULATED

## (undated) DEVICE — SUT CTD VICRYL VIL BR CR/SH

## (undated) DEVICE — SKINMARKER W/RULER DEVON

## (undated) DEVICE — DRAIN CHANNEL ROUND 19FR

## (undated) DEVICE — SEE MEDLINE ITEM 157116

## (undated) DEVICE — DRAPE TIBURON ORTHOPEDIC SPLIT

## (undated) DEVICE — COVER PROBE NEOGUARD 1X11.8IN

## (undated) DEVICE — NDL 22GA X1 1/2 REG BEVEL

## (undated) DEVICE — SUT MONOCRYL 5-0 P-3 UND 18

## (undated) DEVICE — CLOSURE SKIN STERI STRIP 1/2X4

## (undated) DEVICE — SEE MEDLINE ITEM 152622

## (undated) DEVICE — UNDERGLOVE BIOGEL PI SZ 6.5 LF

## (undated) DEVICE — SUT VICRYL PLUS 2-0 CT1 18

## (undated) DEVICE — SYR 10CC LUER LOCK

## (undated) DEVICE — DRAPE STERI INSTRUMENT 1018

## (undated) DEVICE — SUT CTD VICRYL 3-0 CR/SH

## (undated) DEVICE — SEE MEDLINE ITEM 157117

## (undated) DEVICE — GAUZE SPONGE 4X4 12PLY

## (undated) DEVICE — ELECTRODE BLADE INSULATED 1 IN

## (undated) DEVICE — STAPLER SKIN ROTATING HEAD

## (undated) DEVICE — STOCKINET TUBULAR 1 PLY 6X60IN

## (undated) DEVICE — SEE MEDLINE ITEM 146345

## (undated) DEVICE — SEE MEDLINE ITEM 157131

## (undated) DEVICE — NDL HYPO REG 25G X 1 1/2

## (undated) DEVICE — SEE MEDLINE ITEM 154981

## (undated) DEVICE — SPONGE LAP 18X18 PREWASHED